# Patient Record
Sex: MALE
[De-identification: names, ages, dates, MRNs, and addresses within clinical notes are randomized per-mention and may not be internally consistent; named-entity substitution may affect disease eponyms.]

---

## 2017-09-08 ENCOUNTER — HOSPITAL ENCOUNTER (INPATIENT)
Dept: HOSPITAL 25 - ED | Age: 62
LOS: 28 days | Discharge: HOME | DRG: 885 | End: 2017-10-06
Attending: PSYCHIATRY & NEUROLOGY | Admitting: PSYCHIATRY & NEUROLOGY
Payer: MEDICARE

## 2017-09-08 DIAGNOSIS — K59.00: ICD-10-CM

## 2017-09-08 DIAGNOSIS — F20.5: Primary | ICD-10-CM

## 2017-09-08 DIAGNOSIS — Z81.8: ICD-10-CM

## 2017-09-08 DIAGNOSIS — I48.91: ICD-10-CM

## 2017-09-08 DIAGNOSIS — I35.0: ICD-10-CM

## 2017-09-08 DIAGNOSIS — Z91.19: ICD-10-CM

## 2017-09-08 DIAGNOSIS — Z91.14: ICD-10-CM

## 2017-09-08 LAB
ALBUMIN SERPL BCG-MCNC: 3.9 G/DL (ref 3.2–5.2)
ALP SERPL-CCNC: 50 U/L (ref 34–104)
ALT SERPL W P-5'-P-CCNC: 66 U/L (ref 7–52)
ANION GAP SERPL CALC-SCNC: 7 MMOL/L (ref 2–11)
APAP SERPL-MCNC: < 15 MCG/ML
AST SERPL-CCNC: 64 U/L (ref 13–39)
BUN SERPL-MCNC: 11 MG/DL (ref 6–24)
BUN/CREAT SERPL: 13.4 (ref 8–20)
CALCIUM SERPL-MCNC: 9.1 MG/DL (ref 8.6–10.3)
CHLORIDE SERPL-SCNC: 104 MMOL/L (ref 101–111)
GLOBULIN SER CALC-MCNC: 2.5 G/DL (ref 2–4)
GLUCOSE SERPL-MCNC: 101 MG/DL (ref 70–100)
HCO3 SERPL-SCNC: 26 MMOL/L (ref 22–32)
HCT VFR BLD AUTO: 38 % (ref 42–52)
HGB BLD-MCNC: 13.4 G/DL (ref 14–18)
LITHIUM SERPL-SCNC: < 0.1 MMOL/L (ref 0.6–1.2)
MCH RBC QN AUTO: 35 PG (ref 27–31)
MCHC RBC AUTO-ENTMCNC: 36 G/DL (ref 31–36)
MCV RBC AUTO: 97 FL (ref 80–94)
POTASSIUM SERPL-SCNC: 3.6 MMOL/L (ref 3.5–5)
PROT SERPL-MCNC: 6.4 G/DL (ref 6.4–8.9)
RBC # BLD AUTO: 3.9 10^6/UL (ref 4–5.4)
SALICYLATES SERPL-MCNC: < 2.5 MG/DL (ref ?–30)
SODIUM SERPL-SCNC: 137 MMOL/L (ref 133–145)
TSH SERPL-ACNC: 0.95 MCIU/ML (ref 0.34–5.6)
WBC # BLD AUTO: 4.9 10^3/UL (ref 3.5–10.8)

## 2017-09-08 PROCEDURE — 81003 URINALYSIS AUTO W/O SCOPE: CPT

## 2017-09-08 PROCEDURE — 93005 ELECTROCARDIOGRAM TRACING: CPT

## 2017-09-08 PROCEDURE — 85610 PROTHROMBIN TIME: CPT

## 2017-09-08 PROCEDURE — 80053 COMPREHEN METABOLIC PANEL: CPT

## 2017-09-08 PROCEDURE — 85730 THROMBOPLASTIN TIME PARTIAL: CPT

## 2017-09-08 PROCEDURE — 99231 SBSQ HOSP IP/OBS SF/LOW 25: CPT

## 2017-09-08 PROCEDURE — 99238 HOSP IP/OBS DSCHRG MGMT 30/<: CPT

## 2017-09-08 PROCEDURE — 36415 COLL VENOUS BLD VENIPUNCTURE: CPT

## 2017-09-08 PROCEDURE — 80329 ANALGESICS NON-OPIOID 1 OR 2: CPT

## 2017-09-08 PROCEDURE — G0480 DRUG TEST DEF 1-7 CLASSES: HCPCS

## 2017-09-08 PROCEDURE — 80178 ASSAY OF LITHIUM: CPT

## 2017-09-08 PROCEDURE — 80307 DRUG TEST PRSMV CHEM ANLYZR: CPT

## 2017-09-08 PROCEDURE — 83036 HEMOGLOBIN GLYCOSYLATED A1C: CPT

## 2017-09-08 PROCEDURE — 85025 COMPLETE CBC W/AUTO DIFF WBC: CPT

## 2017-09-08 PROCEDURE — 84443 ASSAY THYROID STIM HORMONE: CPT

## 2017-09-08 PROCEDURE — 80320 DRUG SCREEN QUANTALCOHOLS: CPT

## 2017-09-08 PROCEDURE — 80061 LIPID PANEL: CPT

## 2017-09-08 PROCEDURE — 90853 GROUP PSYCHOTHERAPY: CPT

## 2017-09-08 PROCEDURE — 99222 1ST HOSP IP/OBS MODERATE 55: CPT

## 2017-09-08 NOTE — ED
Alana MUSTAFA Thomas, scribed for Osiris Salomon MD on 09/08/17 at 2111 .





Psychiatric Complaint





- HPI Summary


HPI Summary: 





LEVEL 5 CAVEAT: HPI IS LIMITED BY PATIENTS UNCOOPERATIVE DEMEANOR 





The patient is a 63 y/o M BIB police. Upon questioning, the patient responds I 

refuse to talk to anyone. There is additional history provided in nursing 

documentation. He is pacing and agitated. 





- History Of Current Complaint


Chief Complaint: EDMentalHealth


Time Seen by Provider: 09/08/17 18:50


Hx Obtained From: Other: - Police, EMR, nursing


Character: Angry - refusing to talk or cooperate with MD and staff





- Allergies/Home Medications


Allergies/Adverse Reactions: 


 Allergies











Allergy/AdvReac Type Severity Reaction Status Date / Time


 


Molds & Smuts Allergy Unknown Unknown Verified 03/13/16 15:55





   Reaction  





   Details  


 


Penicillins Allergy Unknown Unknown Verified 03/13/16 15:55





   Reaction  





   Details  














PMH/Surg Hx/FS Hx/Imm Hx


Previously Healthy: No - LEVEL 5 CAVEAT: PMH IS LIMITED BY PATIENTS 

UNCOOPERATIVE DEMEANOR 


Endocrine/Hematology History: 


   Denies: Hx Anticoagulant Therapy


Sensory History: Reports: Hx Contacts or Glasses


Opthamlomology History: Reports: Hx Contacts or Glasses


Neurological History: Reports: Other Neuro Impairments/Disorders - hx of 

benztropine tx


   Denies: Hx Seizures


Psychiatric History: Reports: Hx Anxiety, Hx Depression, Hx Inpatient Treatment 

- [facility] from [facility] in 2013, Hx Community Mental Health Tx, Hx Schizophrenia - hx 

of navane tx. , Hx Bipolar Disorder, Hx of Violent Episodes Against Others, 

Other Psychiatric Issues/Disorders - Psychosis





- Immunization History


Date of Tetanus Vaccine: unk


Infectious Disease History: Unable to Obtain/Confirm


Infectious Disease History: 


   Denies: Traveled Outside the [place] in Last 30 Days





- Family History


Known Family History: Positive: Unknown - Patient is a LEVEL 5 CAVEAT


Family History: schizophrenia, depression





- Social History


Alcohol Use: refuses to cooperate with assessment


Substance Use Type: Reports: None


Smoking Status (MU): Never Smoked Tobacco





Review of Systems





- ROS Summary


Review of Systems Summary: 





LEVEL 5 CAVEAT: ROS IS LIMITED BY PATIENTS UNCOOPERATIVE DEMEANOR 








Negative: Fever


Positive: Other - POS: pacing, agitation


All Other Systems Reviewed And Are Negative: No





Physical Exam





- Summary


Physical Exam Summary: 





LEVEL 5 CAVEAT: PHYSICAL EXAM IS LIMITED BY PATIENTS UNCOOPERATIVE DEMEANOR 


Physical exam is by observation only.  Pt will not allow physical contact by me.





Triage Information Reviewed: Yes


Vital Signs On Initial Exam: 


 Initial Vitals











Temp Pulse Resp BP Pulse Ox


 


 98.8 F   106   18   174/81   99 


 


 09/08/17 17:21  09/08/17 17:21  09/08/17 17:21  09/08/17 17:21  09/08/17 17:21











Vital Signs Reviewed: Yes


Appearance: Positive: Well-Appearing, No Pain Distress, Well-Nourished


Skin: Positive: Skin Color Reflects Adequate Perfusion, Dry


Head/Face: Positive: Normal Head/Face Inspection


Eyes: Positive: Conjunctiva Clear


ENT: Positive: Normal ENT inspection


Neck: Positive: Supple


Respiratory/Lung Sounds: Positive: Other - No respiratory distress


Musculoskeletal: Positive: Strength/ROM Intact


Neurological: Positive: Facial Symmetry, Speech Normal


Psychiatric: Positive: Other - He is pacing and agitated, Patient Uncooperative 

for Exam





- [place] Coma Scale


Coma Scale Total: 14





Diagnostics





- Vital Signs


 Vital Signs











  Temp Pulse Resp BP Pulse Ox


 


 09/08/17 19:01    19  


 


 09/08/17 17:44  98.8 F  106  18  174/81  99


 


 09/08/17 17:21  98.8 F  106  18  174/81  99














- Laboratory


Lab Results: 


 Lab Results











  09/08/17 09/08/17 09/08/17 Range/Units





  18:05 18:05 18:40 


 


WBC   4.9   (3.5-10.8)  10^3/ul


 


RBC   3.90 L   (4.0-5.4)  10^6/ul


 


Hgb   13.4 L   (14.0-18.0)  g/dl


 


Hct   38 L   (42-52)  %


 


MCV   97 H   (80-94)  fL


 


MCH   35 H   (27-31)  pg


 


MCHC   36   (31-36)  g/dl


 


RDW   13   (10.5-15)  %


 


Plt Count   143 L   (150-450)  10^3/ul


 


MPV   8   (7.4-10.4)  um3


 


Neut % (Auto)   63.2   (38-83)  %


 


Lymph % (Auto)   23.6 L   (25-47)  %


 


Mono % (Auto)   10.3 H   (1-9)  %


 


Eos % (Auto)   2.4   (0-6)  %


 


Baso % (Auto)   0.5   (0-2)  %


 


Absolute Neuts (auto)   3.1   (1.5-7.7)  10^3/ul


 


Absolute Lymphs (auto)   1.2   (1.0-4.8)  10^3/ul


 


Absolute Monos (auto)   0.5   (0-0.8)  10^3/ul


 


Absolute Eos (auto)   0.1   (0-0.6)  10^3/ul


 


Absolute Basos (auto)   0   (0-0.2)  10^3/ul


 


Absolute Nucleated RBC   0   10^3/ul


 


Nucleated RBC %   0.1   


 


Sodium  137    (133-145)  mmol/L


 


Potassium  3.6    (3.5-5.0)  mmol/L


 


Chloride  104    (101-111)  mmol/L


 


Carbon Dioxide  26    (22-32)  mmol/L


 


Anion Gap  7    (2-11)  mmol/L


 


BUN  11    (6-24)  mg/dL


 


Creatinine  0.82    (0.67-1.17)  mg/dL


 


Est GFR ( Amer)  122.8    (>60)  


 


Est GFR (Non-Af Amer)  95.5    (>60)  


 


BUN/Creatinine Ratio  13.4    (8-20)  


 


Glucose  101 H    ()  mg/dL


 


Calcium  9.1    (8.6-10.3)  mg/dL


 


Total Bilirubin  1.10 H    (0.2-1.0)  mg/dL


 


AST  64 H    (13-39)  U/L


 


ALT  66 H    (7-52)  U/L


 


Alkaline Phosphatase  50    ()  U/L


 


Total Protein  6.4    (6.4-8.9)  g/dL


 


Albumin  3.9    (3.2-5.2)  g/dL


 


Globulin  2.5    (2-4)  g/dL


 


Albumin/Globulin Ratio  1.6    (1-3)  


 


TSH  0.95    (0.34-5.60)  mcIU/mL


 


Urine Color     


 


Urine Appearance     


 


Urine pH     (5-9)  


 


Ur Specific Gravity     (1.010-1.030)  


 


Urine Protein     (Negative)  


 


Urine Ketones     (Negative)  


 


Urine Blood     (Negative)  


 


Urine Nitrate     (Negative)  


 


Urine Bilirubin     (Negative)  


 


Urine Urobilinogen     (Negative)  


 


Ur Leukocyte Esterase     (Negative)  


 


Urine Glucose     (Negative)  


 


Salicylates  < 2.50    (<30)  mg/dL


 


Urine Opiates Screen    None detected  (None Detect)  


 


Acetaminophen  < 15    mcg/mL


 


Ur Barbiturates Screen    None detected  (None Detect)  


 


Ur Phencyclidine Scrn    None detected  (None Detect)  


 


Ur Amphetamines Screen    None detected  (None Detect)  


 


U Benzodiazepines Scrn    None detected  (None Detect)  


 


Urine Cocaine Screen    None detected  (None Detect)  


 


U Cannabinoids Screen    None detected  (None Detect)  


 


Serum Alcohol  < 10    (<10)  mg/dL














  09/08/17 Range/Units





  18:40 


 


WBC   (3.5-10.8)  10^3/ul


 


RBC   (4.0-5.4)  10^6/ul


 


Hgb   (14.0-18.0)  g/dl


 


Hct   (42-52)  %


 


MCV   (80-94)  fL


 


MCH   (27-31)  pg


 


MCHC   (31-36)  g/dl


 


RDW   (10.5-15)  %


 


Plt Count   (150-450)  10^3/ul


 


MPV   (7.4-10.4)  um3


 


Neut % (Auto)   (38-83)  %


 


Lymph % (Auto)   (25-47)  %


 


Mono % (Auto)   (1-9)  %


 


Eos % (Auto)   (0-6)  %


 


Baso % (Auto)   (0-2)  %


 


Absolute Neuts (auto)   (1.5-7.7)  10^3/ul


 


Absolute Lymphs (auto)   (1.0-4.8)  10^3/ul


 


Absolute Monos (auto)   (0-0.8)  10^3/ul


 


Absolute Eos (auto)   (0-0.6)  10^3/ul


 


Absolute Basos (auto)   (0-0.2)  10^3/ul


 


Absolute Nucleated RBC   10^3/ul


 


Nucleated RBC %   


 


Sodium   (133-145)  mmol/L


 


Potassium   (3.5-5.0)  mmol/L


 


Chloride   (101-111)  mmol/L


 


Carbon Dioxide   (22-32)  mmol/L


 


Anion Gap   (2-11)  mmol/L


 


BUN   (6-24)  mg/dL


 


Creatinine   (0.67-1.17)  mg/dL


 


Est GFR ( Amer)   (>60)  


 


Est GFR (Non-Af Amer)   (>60)  


 


BUN/Creatinine Ratio   (8-20)  


 


Glucose   ()  mg/dL


 


Calcium   (8.6-10.3)  mg/dL


 


Total Bilirubin   (0.2-1.0)  mg/dL


 


AST   (13-39)  U/L


 


ALT   (7-52)  U/L


 


Alkaline Phosphatase   ()  U/L


 


Total Protein   (6.4-8.9)  g/dL


 


Albumin   (3.2-5.2)  g/dL


 


Globulin   (2-4)  g/dL


 


Albumin/Globulin Ratio   (1-3)  


 


TSH   (0.34-5.60)  mcIU/mL


 


Urine Color  Straw  


 


Urine Appearance  Clear  


 


Urine pH  5.0  (5-9)  


 


Ur Specific Gravity  1.005 L  (1.010-1.030)  


 


Urine Protein  Negative  (Negative)  


 


Urine Ketones  Trace H  (Negative)  


 


Urine Blood  Negative  (Negative)  


 


Urine Nitrate  Negative  (Negative)  


 


Urine Bilirubin  Negative  (Negative)  


 


Urine Urobilinogen  Negative  (Negative)  


 


Ur Leukocyte Esterase  Negative  (Negative)  


 


Urine Glucose  Negative  (Negative)  


 


Salicylates   (<30)  mg/dL


 


Urine Opiates Screen   (None Detect)  


 


Acetaminophen   mcg/mL


 


Ur Barbiturates Screen   (None Detect)  


 


Ur Phencyclidine Scrn   (None Detect)  


 


Ur Amphetamines Screen   (None Detect)  


 


U Benzodiazepines Scrn   (None Detect)  


 


Urine Cocaine Screen   (None Detect)  


 


U Cannabinoids Screen   (None Detect)  


 


Serum Alcohol   (<10)  mg/dL











Result Diagrams: 


 09/22/17 17:47





 09/22/17 18:30


Lab Statement: Any lab studies that have been ordered have been reviewed, and 

results considered in the medical decision making process.





- EKG


  ** 21:30


Cardiac Rate: NL - A-Fib at 88 BPM. Nml AV/IV/QTc. Left axis deviation (2). No 

acute changes. Nonspecific ST T-Wave changes.


EKG Comparison: No Significant Change - compard to 05/11/13





Course/Dx





- Course


Assessment/Plan: The patient is a 63 y/o M who is brought in by police and is 

uncooperative on examination. He is medically cleared at 19:27. Bloodwork shows 

RBC 3.9, Hgb 13.4, Hct 38, Plt Count 143, bilirubin 1.1, AST 64, ALT 66. UA 

shows trace ketones and specific gravity 1.005. Urine toxicology is negative 

and lithium is less than 0.10. EKG shows A-Fib at 88 BPM with no acute changes. 

He is diagnosed with Bipolar disorder acute exacerbation, medication non-

compliance, and elevated liver functions, chronic atrial fibrillation at a 

controlled rate. He will be admitted to [facility].





- Differential Dx/Clinical Impression


Provider Diagnosis: 


 Bipolar disorder acute exacerbation, Non compliance w medication regimen, 

Elevated liver function tests








- Critical Care Time


Critical Care Time: 30-74 min - 30 minutes in uncooperative mental health 

patient requiring medical restraints with haldol, benadryl and lorazepam.





Discharge





- Discharge Plan


Condition: Fair


Disposition: ADMITTED TO [facility] documentation as recorded by the Alana bernabe Thomas accurately reflects 

the service I personally performed and the decisions made by Aime corrales Barbara J, MD.

## 2017-09-09 RX ADMIN — Medication SCH: at 14:29

## 2017-09-09 RX ADMIN — WARFARIN SODIUM SCH: 2.5 TABLET ORAL at 17:31

## 2017-09-09 RX ADMIN — LOSARTAN POTASSIUM SCH: 25 TABLET, FILM COATED ORAL at 14:29

## 2017-09-09 RX ADMIN — Medication SCH: at 17:31

## 2017-09-09 RX ADMIN — WARFARIN SODIUM SCH: 2 TABLET ORAL at 17:30

## 2017-09-09 RX ADMIN — BENZTROPINE MESYLATE SCH: 1 TABLET ORAL at 14:29

## 2017-09-09 RX ADMIN — LITHIUM CARBONATE SCH: 300 TABLET ORAL at 01:07

## 2017-09-09 RX ADMIN — WARFARIN SODIUM SCH MG: 2 TABLET ORAL at 17:55

## 2017-09-09 RX ADMIN — SENNOSIDES SCH: 8.6 TABLET, FILM COATED ORAL at 14:29

## 2017-09-09 RX ADMIN — THERA TABS SCH: TAB at 14:29

## 2017-09-09 RX ADMIN — CLONAZEPAM SCH: 1 TABLET ORAL at 22:28

## 2017-09-09 RX ADMIN — LITHIUM CARBONATE SCH MG: 300 TABLET ORAL at 20:12

## 2017-09-09 RX ADMIN — POLYETHYLENE GLYCOL 3350 SCH: 17 POWDER, FOR SOLUTION ORAL at 14:29

## 2017-09-09 RX ADMIN — WARFARIN SODIUM SCH MG: 2.5 TABLET ORAL at 17:56

## 2017-09-09 RX ADMIN — SENNOSIDES SCH: 8.6 TABLET, FILM COATED ORAL at 20:16

## 2017-09-09 RX ADMIN — Medication SCH: at 22:28

## 2017-09-09 RX ADMIN — BENZTROPINE MESYLATE SCH: 1 TABLET ORAL at 22:28

## 2017-09-10 RX ADMIN — Medication SCH: at 13:20

## 2017-09-10 RX ADMIN — WARFARIN SODIUM SCH: 2.5 TABLET ORAL at 17:35

## 2017-09-10 RX ADMIN — LITHIUM CARBONATE SCH: 300 TABLET ORAL at 21:24

## 2017-09-10 RX ADMIN — THERA TABS SCH: TAB at 13:20

## 2017-09-10 RX ADMIN — BENZTROPINE MESYLATE SCH: 1 TABLET ORAL at 13:20

## 2017-09-10 RX ADMIN — LOSARTAN POTASSIUM SCH: 25 TABLET, FILM COATED ORAL at 13:20

## 2017-09-10 RX ADMIN — Medication SCH: at 17:35

## 2017-09-10 RX ADMIN — POLYETHYLENE GLYCOL 3350 SCH: 17 POWDER, FOR SOLUTION ORAL at 13:20

## 2017-09-10 RX ADMIN — BENZTROPINE MESYLATE SCH: 1 TABLET ORAL at 21:23

## 2017-09-10 RX ADMIN — SENNOSIDES SCH: 8.6 TABLET, FILM COATED ORAL at 21:24

## 2017-09-10 RX ADMIN — SENNOSIDES SCH: 8.6 TABLET, FILM COATED ORAL at 13:20

## 2017-09-10 RX ADMIN — Medication SCH: at 21:23

## 2017-09-10 RX ADMIN — CLONAZEPAM SCH: 1 TABLET ORAL at 21:23

## 2017-09-10 RX ADMIN — WARFARIN SODIUM SCH: 2 TABLET ORAL at 17:35

## 2017-09-11 RX ADMIN — Medication SCH: at 10:39

## 2017-09-11 RX ADMIN — RISPERIDONE SCH: 3 TABLET ORAL at 21:29

## 2017-09-11 RX ADMIN — WARFARIN SODIUM SCH: 2.5 TABLET ORAL at 18:04

## 2017-09-11 RX ADMIN — SENNOSIDES SCH: 8.6 TABLET, FILM COATED ORAL at 21:29

## 2017-09-11 RX ADMIN — THERA TABS SCH: TAB at 10:39

## 2017-09-11 RX ADMIN — POLYETHYLENE GLYCOL 3350 SCH: 17 POWDER, FOR SOLUTION ORAL at 10:39

## 2017-09-11 RX ADMIN — CLONAZEPAM SCH: 1 TABLET ORAL at 21:28

## 2017-09-11 RX ADMIN — Medication SCH: at 18:04

## 2017-09-11 RX ADMIN — LOSARTAN POTASSIUM SCH: 25 TABLET, FILM COATED ORAL at 10:39

## 2017-09-11 RX ADMIN — ALUMINUM HYDROXIDE, MAGNESIUM HYDROXIDE, AND SIMETHICONE PRN ML: 200; 200; 20 SUSPENSION ORAL at 17:22

## 2017-09-11 RX ADMIN — BENZTROPINE MESYLATE SCH: 1 TABLET ORAL at 21:28

## 2017-09-11 RX ADMIN — Medication SCH TAB: at 14:00

## 2017-09-11 RX ADMIN — SENNOSIDES SCH: 8.6 TABLET, FILM COATED ORAL at 10:39

## 2017-09-11 RX ADMIN — BENZTROPINE MESYLATE SCH: 1 TABLET ORAL at 10:39

## 2017-09-11 RX ADMIN — LITHIUM CARBONATE SCH: 300 TABLET ORAL at 21:29

## 2017-09-11 RX ADMIN — ALUMINUM HYDROXIDE, MAGNESIUM HYDROXIDE, AND SIMETHICONE PRN ML: 200; 200; 20 SUSPENSION ORAL at 12:40

## 2017-09-11 RX ADMIN — Medication SCH: at 21:28

## 2017-09-11 RX ADMIN — WARFARIN SODIUM SCH: 2 TABLET ORAL at 18:04

## 2017-09-11 NOTE — PN
Subjective





- Subjective


Service Type: 63655 Hosp care 15 min low complexity


Subjective: 





Patient noted to be dismissive of staff and focused on discharge since 

admission. Patient declined interview with this provider.


Patient is noted to be on coumadin and RN reported to this provider that his 

last INR was 1.1 on 9/7/17. Patient's Lithium level


also noted to be subtherapeutic on admission at 0.10. Patient's Navane nor his 

Risperdal was not restarted on admission. Patient


is on Coumadin therapy for Afib but there seems to be 2 orders entered for 

admission at the same time. Patient has likely been 


non-compliant with these meds as well. Patient noted to be non-compliant with 

ACT team. Patient has had multiple psychiatric 


admissions most 2/2 becoming med non-compliant. Patient admitted this Saturday 

for decompensated paranoid Schizophrenia. 


Will D/C Navane and patient historically has asked to be taken off this med. 

Will continue Risperdal with plan to initiate Invega Sustenna


prior to his discharge.





Objective





- Appearance


Appearance: Healthy Appearing, Thin Framed


Dysmorphic Features: No


Hygiene: Normal


Grooming: Fairly Well Kept





- Behavior


Psychomotor Activities: Normal


Exhibits Abnormal Movement: No





- Attitude and Relatedness


Attitude and Relatedness: Regressed


Eye Contact: Poor





- Speech


Quality: Unpressured


Latencies: Normal


Quantity: Terse





- Affect


Observed Affect: Tense


Affect Consistent with: Dysphoria





- Thought Process


Thought Content: Yes Paranoid Ideation - present on admission, unable to assess 

today, No Passive Death Wish - unable to assess, No Suicidal Planning - unable 

to assess, No Homicidal Ideation - unable to assess





- Sensorium


Type of Hallucinations: Visual: No - unable to assess, Auditory: No - unable to 

assess, Command: No - unable to assess





- Level of Consciousness


Level of Consciousness: Alert


Orientation: Yes Intact, Yes Orientated to Time, Yes Orientated to Place, Yes 

Orientated to Person





- Impulse Control


Impulse Control: Impaired





- Insight and Judgement


Insight and Judgement: Impaired





- Group Participation


Particating in Group Activities: No





- Medication Management


Medication Management Adherence: No





Assessment





- Assessment


Merits Inpatient Hospitalization: For Immediate Safety, For Stabilization


Inpatient DSM-IV Dx: Schizophrenia





Plan





- Plan


Treatment Plan: 


Name: JYOTI STEEN                        


YOB: 1955                        


[id number]


[id number]








1. Continue admission to [facility] for safety and symptom mx.


2. Patient's Navane nor his Risperdal was not restarted on admission.


    Will D/C Navane due to patient's reported disdain for med and hx of med non-

compliance.


3. Will Continue Risperdal at 3mg po qhs for psychosis with plan to initiate 

Invega Sustenna prior to discharge.


4. Continue Lithium at home dose of 1,200mg po qhs for mood.  


   Patient's Lithium level noted to be subtherapeutic on admission at 0.10. 


5. Will call outpt provider who Rx's Coumadin to clarify dose. Will continue 

Coumadin as currently Rx'd. 


    Patient's last INR was 1.1 on 9/7/17.  


6. Continue to obtain collateral information from family and ACTteam. 


7. Patient to participate in milieu activities and groups.


Medications: 


 Current Medications





Acetaminophen (Tylenol Tab*)  650 mg PO Q4H PRN


   PRN Reason: PAIN or TEMP > 101 F


Al Hydrox/Mg Hydrox/Simethicone (Maalox Plus*)  30 ml PO Q4H PRN


   PRN Reason: INDIGESTION


Benztropine Mesylate (Cogentin Tab*)  1 mg PO BID [facility]


   Last Admin: 09/10/17 21:23 Dose:  Not Given


Calcium/Vitamin D (Oscal D Tab 250/125*)  2 tab PO BID [facility]


   Last Admin: 09/10/17 21:23 Dose:  Not Given


Clonazepam (Klonopin Tab(*))  1 mg PO BEDTIME [facility]


   Last Admin: 09/10/17 21:23 Dose:  Not Given


Lithium Carbonate (Lithium Carbonate Tab*)  1,200 mg PO BEDTIME [facility]


   Last Admin: 09/10/17 21:24 Dose:  Not Given


Losartan Potassium (Cozaar Tab*)  25 mg PO DAILY [facility]


   Last Admin: 09/10/17 13:20 Dose:  Not Given


Multivitamins (Theragran Tab*)  1 tab PO DAILY [facility]


   Last Admin: 09/10/17 13:20 Dose:  Not Given


Nicotine Polacrilex (Nicotine Gum*)  2 mg PO Q2H PRN


   PRN Reason: CRAVING


Non Formulary  Med* (Melatonin 1mg Tab)  1 admin PO 1700 [facility]


   Last Admin: 09/10/17 17:35 Dose:  Not Given


Polyethylene Glycol/Electrolytes (Miralax*)  17 gm PO DAILY [facility]


   Last Admin: 09/10/17 13:20 Dose:  Not Given


Senna (Senokot Tab*)  1 tab PO BID [facility]


   Last Admin: 09/10/17 21:24 Dose:  Not Given


Warfarin Sodium (Coumadin Tab(*))  2 mg PO 1700 [facility]


   Last Admin: 09/10/17 17:35 Dose:  Not Given


Warfarin Sodium (Coumadin Tab(*))  2.5 mg PO 1700 [facility]


   Last Admin: 09/10/17 17:35 Dose:  Not Given











- Discharge Plan


Discharge Plan: Outpatient Follow Up

## 2017-09-12 RX ADMIN — LOSARTAN POTASSIUM SCH: 25 TABLET, FILM COATED ORAL at 09:44

## 2017-09-12 RX ADMIN — WARFARIN SODIUM SCH: 2.5 TABLET ORAL at 16:46

## 2017-09-12 RX ADMIN — POLYETHYLENE GLYCOL 3350 SCH: 17 POWDER, FOR SOLUTION ORAL at 09:44

## 2017-09-12 RX ADMIN — CLONAZEPAM SCH: 1 TABLET ORAL at 20:55

## 2017-09-12 RX ADMIN — SENNOSIDES SCH: 8.6 TABLET, FILM COATED ORAL at 20:55

## 2017-09-12 RX ADMIN — CLONAZEPAM SCH: 1 TABLET ORAL at 07:25

## 2017-09-12 RX ADMIN — Medication SCH: at 20:55

## 2017-09-12 RX ADMIN — CLONAZEPAM SCH MG: 1 TABLET ORAL at 00:56

## 2017-09-12 RX ADMIN — WARFARIN SODIUM SCH: 2 TABLET ORAL at 16:45

## 2017-09-12 RX ADMIN — Medication SCH: at 09:44

## 2017-09-12 RX ADMIN — RISPERIDONE SCH: 3 TABLET ORAL at 20:55

## 2017-09-12 RX ADMIN — BENZTROPINE MESYLATE SCH: 1 TABLET ORAL at 09:43

## 2017-09-12 RX ADMIN — LITHIUM CARBONATE SCH: 300 TABLET ORAL at 20:55

## 2017-09-12 RX ADMIN — SENNOSIDES SCH: 8.6 TABLET, FILM COATED ORAL at 09:44

## 2017-09-12 RX ADMIN — Medication SCH: at 16:45

## 2017-09-12 RX ADMIN — BENZTROPINE MESYLATE SCH: 1 TABLET ORAL at 20:55

## 2017-09-12 RX ADMIN — THERA TABS SCH: TAB at 09:44

## 2017-09-12 RX ADMIN — RISPERIDONE SCH MG: 3 TABLET ORAL at 00:57

## 2017-09-12 NOTE — PN
Subjective





- Subjective


Service Type: 70540 Hosp care 15 min low complexity


Subjective: 





Patient noted to be isolated to his room most of the day. He is up for meals 

and is makes needs known to staff.


Patient continues to be bizarre in behavior and has been observed responding to 

internal stimuli. Patient has refused 


meds. He also refused blood draw for repeat INR. Patient again declines to 

interview with this provider. Will initiate 


process of TOO.





Objective





- Appearance


Appearance: Well Developed/Nourished, Thin Framed


Dysmorphic Features: No


Hygiene: Normal


Grooming: Fairly Well Kept





- Behavior


Psychomotor Activities: Abnormal-Increased


Exhibits Abnormal Movement: No





- Attitude and Relatedness


Attitude and Relatedness: Irritable


Eye Contact: Poor





- Speech


Quality: Unpressured


Latencies: Normal


Quantity: Terse





- Mood


Patient's Decription of Mood: Unable to assess.





- Affect


Observed Affect: Tense


Affect Consistent with: Dysphoria





- Thought Process


Patient's Thought Process: Impoverished


Thought Content: No Passive Death Wish - Unable to assess., No Suicidal 

Planning - Unable to assess., No Homicidal Ideation - Unable to assess., No 

Paranoid Ideation - Unable to assess.





- Sensorium


Experiencing Hallucinations: No, Sensorium is Clear


Type of Hallucinations: Visual: No - Unable to assess., Auditory: No - Unable 

to assess., Command: No - Unable to assess.





- Level of Consciousness


Level of Consciousness: Alert


Orientation: Yes Intact, Yes Orientated to Time, Yes Orientated to Place, Yes 

Orientated to Person





- Impulse Control


Impulse Control: Impaired





- Insight and Judgement


Insight and Judgement: Impaired





- Group Participation


Particating in Group Activities: No





- Medication Management


Medication Management Adherence: No





Assessment





- Assessment


Merits Inpatient Hospitalization: For Immediate Safety, For Stabilization


Inpatient DSM-IV Dx: Schizophrenia





Plan





- Plan


Treatment Plan: 


Name: JYOTI STEEN                        


YOB: 1955                        


[id number]


[id number]








1. Continue admission to [facility] for safety and symptom mx.


2. Patient's Navane nor his Risperdal was restarted on admission.


    Will D/C Navane due to patient's reported disdain for med and hx of med non-

compliance.


3. Will Continue Risperdal at 3mg po qhs for psychosis with plan to initiate 

Invega Sustenna prior to discharge.


4. Continue Lithium at home dose of 1,200mg po qhs for mood.  


   Patient's Lithium level noted to be subtherapeutic on admission at 0.10. 


5. Will continue Coumadin as currently Rx'd. Patient's last INR was 1.16 on 9/8/ 17.  


6. Continue to obtain collateral information from family and ACTteam. 


7. Patient to participate in milieu activities and groups.


Medications: 


 Current Medications





Acetaminophen (Tylenol Tab*)  650 mg PO Q4H PRN


   PRN Reason: PAIN or TEMP > 101 F


Al Hydrox/Mg Hydrox/Simethicone (Maalox Plus*)  30 ml PO Q4H PRN


   PRN Reason: INDIGESTION


   Last Admin: 09/11/17 17:22 Dose:  30 ml


Benztropine Mesylate (Cogentin Tab*)  1 mg PO BID [facility]


   Last Admin: 09/12/17 09:43 Dose:  Not Given


Calcium/Vitamin D (Oscal D Tab 250/125*)  2 tab PO BID [facility]


   Last Admin: 09/12/17 09:44 Dose:  Not Given


Clonazepam (Klonopin Tab(*))  1 mg PO BEDTIME [facility]


   Last Admin: 09/12/17 07:25 Dose:  Not Given


Lithium Carbonate (Lithium Carbonate Tab*)  1,200 mg PO BEDTIME [facility]


   Last Admin: 09/11/17 21:29 Dose:  Not Given


Losartan Potassium (Cozaar Tab*)  25 mg PO DAILY [facility]


   Last Admin: 09/12/17 09:44 Dose:  Not Given


Multivitamins (Theragran Tab*)  1 tab PO DAILY [facility]


   Last Admin: 09/12/17 09:44 Dose:  Not Given


Nicotine Polacrilex (Nicotine Gum*)  2 mg PO Q2H PRN


   PRN Reason: CRAVING


Non Formulary  Med* (Melatonin 1mg Tab)  1 admin PO 1700 [facility]


   Last Admin: 09/11/17 18:04 Dose:  Not Given


Polyethylene Glycol/Electrolytes (Miralax*)  17 gm PO DAILY [facility]


   Last Admin: 09/12/17 09:44 Dose:  Not Given


Risperidone (Risperdal*)  3 mg PO BEDTIME [facility]


   Last Admin: 09/12/17 00:57 Dose:  3 mg


Senna (Senokot Tab*)  1 tab PO BID [facility]


   Last Admin: 09/12/17 09:44 Dose:  Not Given


Warfarin Sodium (Coumadin Tab(*))  2 mg PO 1700 [facility]


   Last Admin: 09/11/17 18:04 Dose:  Not Given


Warfarin Sodium (Coumadin Tab(*))  2.5 mg PO 1700 [facility]


   Last Admin: 09/11/17 18:04 Dose:  Not Given











- Discharge Plan


Discharge Plan: Outpatient Follow Up

## 2017-09-13 RX ADMIN — LOSARTAN POTASSIUM SCH: 25 TABLET, FILM COATED ORAL at 08:16

## 2017-09-13 RX ADMIN — POLYETHYLENE GLYCOL 3350 SCH: 17 POWDER, FOR SOLUTION ORAL at 08:16

## 2017-09-13 RX ADMIN — CLONAZEPAM SCH: 1 TABLET ORAL at 20:03

## 2017-09-13 RX ADMIN — Medication SCH: at 20:03

## 2017-09-13 RX ADMIN — BENZTROPINE MESYLATE SCH: 1 TABLET ORAL at 08:15

## 2017-09-13 RX ADMIN — RIVAROXABAN SCH: 20 TABLET, FILM COATED ORAL at 17:13

## 2017-09-13 RX ADMIN — LITHIUM CARBONATE SCH: 300 TABLET ORAL at 20:03

## 2017-09-13 RX ADMIN — Medication SCH: at 17:14

## 2017-09-13 RX ADMIN — RISPERIDONE SCH: 3 TABLET ORAL at 20:03

## 2017-09-13 RX ADMIN — BENZTROPINE MESYLATE SCH: 1 TABLET ORAL at 20:03

## 2017-09-13 RX ADMIN — THERA TABS SCH: TAB at 08:16

## 2017-09-13 RX ADMIN — Medication SCH: at 08:15

## 2017-09-13 RX ADMIN — POLYETHYLENE GLYCOL 3350 SCH GM: 17 POWDER, FOR SOLUTION ORAL at 08:11

## 2017-09-13 RX ADMIN — SENNOSIDES SCH: 8.6 TABLET, FILM COATED ORAL at 20:03

## 2017-09-13 RX ADMIN — SENNOSIDES SCH: 8.6 TABLET, FILM COATED ORAL at 08:16

## 2017-09-13 NOTE — PN
Subjective





- Subjective


Service Type: [id number] Hosp care 15 min low complexity


Subjective: 





Patient is visible in the milieu pacing. He is noted to be social with select 

peers. Patient continues to be paranoid of


staff and declining initiation in conversation. Patient again declined to speak 

to this provider. Patient educated on why


this provider is concerned with his med non-compliance, with special focus on 

his dx of afib and his increased risk of MI 


or CVA with each day he does not take his coumadin anticoagulation therapy. 

Patient stated, "I don't have atrial fibrillation"


and walked away. Patient has not been med compliant. Patient has attended to 

ADLs, is up for meals, and is able to


verbalize his needs to staff.





Objective





- Appearance


Appearance: Well Developed/Nourished


Dysmorphic Features: No


Hygiene: Normal


Grooming: Fairly Well Kept





- Behavior


Psychomotor Activities: Normal


Exhibits Abnormal Movement: No





- Attitude and Relatedness


Attitude and Relatedness: Psychotically Related


Eye Contact: Poor





- Speech


Quality: Unpressured


Latencies: Normal


Quantity: Terse





- Mood


Patient's Decription of Mood: "Fine"





- Affect


Observed Affect: Constricted


Affect Consistent with: Dysphoria





- Thought Process


Patient's Thought Process: Coherent


Thought Content: No Passive Death Wish - unable to assess, No Suicidal Planning 

- unable to assess, No Homicidal Ideation - unable to assess, No Paranoid 

Ideation - unable to assess





- Sensorium


Experiencing Hallucinations: No, Sensorium is Clear


Type of Hallucinations: Visual: No - unable to assess, Auditory: No - unable to 

assess, Command: No - unable to assess





- Level of Consciousness


Level of Consciousness: Alert


Orientation: Yes Intact, Yes Orientated to Time, Yes Orientated to Place, Yes 

Orientated to Person





- Impulse Control


Impulse Control: Intact





- Insight and Judgement


Insight and Judgement: Fair





- Group Participation


Particating in Group Activities: Yes





- Medication Management


Medication Management Adherence: Yes





Assessment





- Assessment


Merits Inpatient Hospitalization: For Immediate Safety, For Stabilization


Inpatient DSM-IV Dx: Schizophrenia





Plan





- Plan


Treatment Plan: 


Name: JYOTI STEEN                        


YOB: 1955                        


[id number]


[id number]








1. Continue admission to [facility] for safety and symptom mx.


2. Patient's Navane nor his Risperdal was restarted on admission.


    Will D/C Navane due to patient's reported disdain for med and hx of med non-

compliance.


3. Continue to encourage med compliance with Risperdal at 3mg po qhs for 

psychosis with plan to initiate Invega Sustenna prior to discharge.


4. Continue to encourage med compliance with Lithium at home dose of 1,200mg po 

qhs for mood.  


    Patient's Lithium level noted to be sub-therapeutic on admission at 0.10. 


5. Patient informed Coumadin will be replaced for Xarelto due to patient's non-

compliance on Coumadin and to provide immediate anticoagulation


    coverage. Patient's last INR was 1.16 on 9/8/17.  


6. Continue to obtain collateral information from family and ACT team. 


7. Patient to participate in milieu activities and groups.


Continued Medication Management: Different Medication


Medications: 


 Current Medications





Acetaminophen (Tylenol Tab*)  650 mg PO Q4H PRN


   PRN Reason: PAIN or TEMP > 101 F


Al Hydrox/Mg Hydrox/Simethicone (Maalox Plus*)  30 ml PO Q4H PRN


   PRN Reason: INDIGESTION


   Last Admin: 09/11/17 17:22 Dose:  30 ml


Benztropine Mesylate (Cogentin Tab*)  1 mg PO BID [facility]


   Last Admin: 09/13/17 08:15 Dose:  Not Given


Calcium/Vitamin D (Oscal D Tab 250/125*)  2 tab PO BID [facility]


   Last Admin: 09/13/17 08:15 Dose:  Not Given


Clonazepam (Klonopin Tab(*))  1 mg PO BEDTIME [facility]


   Last Admin: 09/12/17 20:55 Dose:  Not Given


Lithium Carbonate (Lithium Carbonate Tab*)  1,200 mg PO BEDTIME [facility]


   Last Admin: 09/12/17 20:55 Dose:  Not Given


Losartan Potassium (Cozaar Tab*)  25 mg PO DAILY [facility]


   Last Admin: 09/13/17 08:16 Dose:  Not Given


Multivitamins (Theragran Tab*)  1 tab PO DAILY [facility]


   Last Admin: 09/13/17 08:16 Dose:  Not Given


Nicotine Polacrilex (Nicotine Gum*)  2 mg PO Q2H PRN


   PRN Reason: CRAVING


Non Formulary  Med* (Melatonin 1mg Tab)  1 admin PO 1700 [facility]


   Last Admin: 09/12/17 16:45 Dose:  Not Given


Polyethylene Glycol/Electrolytes (Miralax*)  17 gm PO DAILY [facility]


   Last Admin: 09/13/17 08:16 Dose:  Not Given


Risperidone (Risperdal*)  3 mg PO BEDTIME [facility]


   Last Admin: 09/12/17 20:55 Dose:  Not Given


Senna (Senokot Tab*)  1 tab PO BID [facility]


   Last Admin: 09/13/17 08:16 Dose:  Not Given


Warfarin Sodium (Coumadin Tab(*))  2 mg PO 1700 [facility]


   Last Admin: 09/12/17 16:45 Dose:  Not Given


Warfarin Sodium (Coumadin Tab(*))  2.5 mg PO 1700 [facility]


   Last Admin: 09/12/17 16:46 Dose:  Not Given











- Discharge Plan


Discharge Plan: Outpatient Follow Up


Outpatient Program: [facility]

## 2017-09-14 RX ADMIN — POLYETHYLENE GLYCOL 3350 SCH: 17 POWDER, FOR SOLUTION ORAL at 10:02

## 2017-09-14 RX ADMIN — LITHIUM CARBONATE SCH: 300 TABLET ORAL at 20:06

## 2017-09-14 RX ADMIN — SENNOSIDES SCH: 8.6 TABLET, FILM COATED ORAL at 10:02

## 2017-09-14 RX ADMIN — LOSARTAN POTASSIUM SCH: 25 TABLET, FILM COATED ORAL at 10:01

## 2017-09-14 RX ADMIN — Medication SCH: at 20:06

## 2017-09-14 RX ADMIN — Medication SCH: at 17:00

## 2017-09-14 RX ADMIN — BENZTROPINE MESYLATE SCH: 1 TABLET ORAL at 10:01

## 2017-09-14 RX ADMIN — RIVAROXABAN SCH: 20 TABLET, FILM COATED ORAL at 15:11

## 2017-09-14 RX ADMIN — SENNOSIDES SCH: 8.6 TABLET, FILM COATED ORAL at 20:07

## 2017-09-14 RX ADMIN — RISPERIDONE SCH: 3 TABLET ORAL at 20:06

## 2017-09-14 RX ADMIN — Medication SCH: at 10:01

## 2017-09-14 RX ADMIN — THERA TABS SCH: TAB at 10:02

## 2017-09-14 RX ADMIN — CLONAZEPAM SCH: 1 TABLET ORAL at 20:06

## 2017-09-14 RX ADMIN — BENZTROPINE MESYLATE SCH: 1 TABLET ORAL at 20:06

## 2017-09-14 RX ADMIN — RIVAROXABAN SCH: 20 TABLET, FILM COATED ORAL at 10:02

## 2017-09-14 NOTE — PN
Subjective





- Subjective


Service Type: [id number] Hosp care 15 min low complexity


Subjective: 





Patient is visible in the milieu pacing, isolative today. He was informed TOO 

process was iniitiated. Patient continues to 


be paranoid of staff. Patient again declined to speak to this provider. Patient 

encouraged patient to take Xarelto for


anticoagulation due to increasing risk of MI/CVA from Afib while not on 

anticoagulation therapy.  This was done to no avail.


Patient has not been compliant with any meds. Patient has attended to ADLs, is 

up for meals, and is able to


verbalize his needs to staff.





Objective





- Appearance


Appearance: Thin Framed


Dysmorphic Features: No


Hygiene: Normal


Grooming: Fairly Well Kept





- Behavior


Psychomotor Activities: Normal


Exhibits Abnormal Movement: No





- Attitude and Relatedness


Attitude and Relatedness: Psychotically Related


Eye Contact: Poor





- Speech


Quality: Unpressured


Latencies: Normal


Quantity: Terse





- Mood


Patient's Decription of Mood: "Fine"





- Affect


Observed Affect: Tense


Affect Consistent with: Dysphoria





- Thought Process


Patient's Thought Process: Coherent


Thought Content: Yes Paranoid Ideation, No Passive Death Wish, No Suicidal 

Planning, No Homicidal Ideation





- Sensorium


Experiencing Hallucinations: No, Sensorium is Clear


Type of Hallucinations: Visual: No, Auditory: No, Command: No





- Level of Consciousness


Level of Consciousness: Alert


Orientation: Yes Intact, Yes Orientated to Time, Yes Orientated to Place, Yes 

Orientated to Person





- Impulse Control


Impulse Control: Intact





- Insight and Judgement


Insight and Judgement: Impaired





- Group Participation


Particating in Group Activities: No





- Medication Management


Medication Management Adherence: No





Assessment





- Assessment


Merits Inpatient Hospitalization: For Immediate Safety, For Stabilization


Inpatient DSM-IV Dx: Schizophrenia





Plan





- Plan


Treatment Plan: 


Name: JYOTI STEEN                        


YOB: 1955                        


[id number]


[id number]








1. Continue admission to [facility] for safety and symptom mx.


2. Patient's Navane nor his Risperdal was restarted on admission.


    Will D/C Navane due to patient's reported disdain for med and hx of med non-

compliance.


3. Continue to encourage med compliance with Risperdal at 3mg po qhs for 

psychosis with plan to initiate Invega Sustenna prior to discharge.


4. Continue to encourage med compliance with Lithium at home dose of 1,200mg po 

qhs for mood.  


    Patient's Lithium level noted to be sub-therapeutic on admission at 0.10. 


5. Continue to encourage Xarelto 20mg po daily for anticoagulation therapy dx-

Afib. Patient non-compliance on 


    Coumadin and Xarelto provides immediate anticoagulation.


6. Collateral information obtained from PCP-Dr. Rachid Luevano#[phone] and 

ACT team. 


7. Patient to participate in milieu activities and groups.


Medications: 


 Current Medications





Acetaminophen (Tylenol Tab*)  650 mg PO Q4H PRN


   PRN Reason: PAIN or TEMP > 101 F


Al Hydrox/Mg Hydrox/Simethicone (Maalox Plus*)  30 ml PO Q4H PRN


   PRN Reason: INDIGESTION


   Last Admin: 09/11/17 17:22 Dose:  30 ml


Benztropine Mesylate (Cogentin Tab*)  1 mg PO BID [facility]


   Last Admin: 09/14/17 10:01 Dose:  Not Given


Calcium/Vitamin D (Oscal D Tab 250/125*)  2 tab PO BID [facility]


   Last Admin: 09/14/17 10:01 Dose:  Not Given


Clonazepam (Klonopin Tab(*))  1 mg PO BEDTIME [facility]


   Last Admin: 09/13/17 20:03 Dose:  Not Given


Lithium Carbonate (Lithium Carbonate Tab*)  1,200 mg PO BEDTIME [facility]


   Last Admin: 09/13/17 20:03 Dose:  Not Given


Losartan Potassium (Cozaar Tab*)  25 mg PO DAILY [facility]


   Last Admin: 09/14/17 10:01 Dose:  Not Given


Melatonin (Melatonin (Nf))  3 mg PO BEDTIME [facility]


Multivitamins (Theragran Tab*)  1 tab PO DAILY [facility]


   Last Admin: 09/14/17 10:02 Dose:  Not Given


Nicotine Polacrilex (Nicotine Gum*)  2 mg PO Q2H PRN


   PRN Reason: CRAVING


Non Formulary  Med* (Melatonin 1mg Tab)  1 admin PO 1700 [facility]


   Last Admin: 09/13/17 17:14 Dose:  Not Given


Polyethylene Glycol/Electrolytes (Miralax*)  17 gm PO DAILY [facility]


   Last Admin: 09/14/17 10:02 Dose:  Not Given


Risperidone (Risperdal*)  3 mg PO BEDTIME [facility]


   Last Admin: 09/13/17 20:03 Dose:  Not Given


Rivaroxaban (Xarelto (*))  20 mg PO DAILY [facility]


   Last Admin: 09/14/17 10:02 Dose:  Not Given


Senna (Senokot Tab*)  1 tab PO BID [facility]


   Last Admin: 09/14/17 10:02 Dose:  Not Given











- Discharge Plan


Discharge Plan: Outpatient Follow Up


Outpatient Program: ACT team

## 2017-09-15 PROCEDURE — GZHZZZZ GROUP PSYCHOTHERAPY: ICD-10-PCS

## 2017-09-15 RX ADMIN — LITHIUM CARBONATE SCH: 300 TABLET ORAL at 20:53

## 2017-09-15 RX ADMIN — RISPERIDONE SCH: 3 TABLET ORAL at 20:53

## 2017-09-15 RX ADMIN — POLYETHYLENE GLYCOL 3350 SCH: 17 POWDER, FOR SOLUTION ORAL at 08:35

## 2017-09-15 RX ADMIN — LOSARTAN POTASSIUM SCH: 25 TABLET, FILM COATED ORAL at 08:35

## 2017-09-15 RX ADMIN — Medication SCH: at 18:01

## 2017-09-15 RX ADMIN — CLONAZEPAM SCH: 1 TABLET ORAL at 20:52

## 2017-09-15 RX ADMIN — THERA TABS SCH: TAB at 08:35

## 2017-09-15 RX ADMIN — Medication SCH: at 08:35

## 2017-09-15 RX ADMIN — BENZTROPINE MESYLATE SCH: 1 TABLET ORAL at 08:35

## 2017-09-15 RX ADMIN — Medication SCH: at 20:53

## 2017-09-15 RX ADMIN — SENNOSIDES SCH: 8.6 TABLET, FILM COATED ORAL at 20:53

## 2017-09-15 RX ADMIN — Medication SCH TAB: at 10:34

## 2017-09-15 RX ADMIN — RIVAROXABAN SCH: 20 TABLET, FILM COATED ORAL at 08:35

## 2017-09-15 RX ADMIN — Medication SCH: at 21:41

## 2017-09-15 RX ADMIN — SENNOSIDES SCH: 8.6 TABLET, FILM COATED ORAL at 08:35

## 2017-09-15 RX ADMIN — BENZTROPINE MESYLATE SCH: 1 TABLET ORAL at 20:52

## 2017-09-15 NOTE — PN
Subjective





- Subjective


Service Type: [id number] Hosp care 15 min low complexity


Subjective: 





Patient continues to decline to talk to this provider. Patient is visible in 

the milieu pacing/walking, isolative today. He was again


encouraged to take Xarelto for anticoagulation due to increased of MI/CVA from 

Afib while not on anticoagulation therapy. This 


was done to no avail. Patient has not been compliant with any meds. Patient has 

attended to ADLs, is up for meals, and is able to


verbalize his needs to staff.





Objective





- Appearance


Appearance: Well Developed/Nourished


Dysmorphic Features: No


Hygiene: Normal


Grooming: Fairly Well Kept





- Behavior


Psychomotor Activities: Normal


Exhibits Abnormal Movement: No





- Attitude and Relatedness


Attitude and Relatedness: Cooperative


Eye Contact: Fair





- Speech


Quality: Unpressured


Latencies: Normal


Quantity: Appropriate





- Mood


Patient's Decription of Mood: "Irritable"





- Affect


Observed Affect: Good


Affect Consistent with: Euthymia





- Thought Process


Patient's Thought Process: Coherent


Thought Content: No Passive Death Wish, No Suicidal Planning, No Homicidal 

Ideation, No Paranoid Ideation





- Sensorium


Experiencing Hallucinations: No, Sensorium is Clear


Type of Hallucinations: Visual: No, Auditory: No, Command: No





- Level of Consciousness


Level of Consciousness: Alert


Orientation: Yes Intact, Yes Orientated to Time, Yes Orientated to Place, Yes 

Orientated to Person





- Impulse Control


Impulse Control: Impaired





- Insight and Judgement


Insight and Judgement: Impaired





- Group Participation


Particating in Group Activities: Yes





- Medication Management


Medication Management Adherence: Yes





Assessment





- Assessment


Merits Inpatient Hospitalization: For Immediate Safety, For Stabilization


Inpatient DSM-IV Dx: Schizophrenia





Plan





- Plan


Treatment Plan: 


Name: JYOTI STEEN                        


YOB: 1955                        


[id number]


[id number]








1. Continue admission to [facility] for safety and symptom mx.


2. Navane D/C'd due to patient's reported disdain for med and hx of med non-

compliance.


3. Continue to encourage med compliance with Risperdal at 3mg po qhs for 

psychosis with plan to initiate Invega Sustenna prior to discharge.


4. Continue to encourage med compliance with Lithium at home dose of 1,200mg po 

qhs for mood.  


    Patient's Lithium level noted to be sub-therapeutic on admission at 0.10. 


5. Continue to encourage Xarelto 20mg po daily for anticoagulation therapy dx-

Afib. Patient non-compliance on 


    Coumadin and Xarelto provides immediate anticoagulation.


6. Collateral information obtained from PCP-Dr. Rachid Luevano#[phone] and 

ACT team. 


7. Patient to participate in milieu activities and groups.


Medications: 


 Current Medications





Acetaminophen (Tylenol Tab*)  650 mg PO Q4H PRN


   PRN Reason: PAIN or TEMP > 101 F


Al Hydrox/Mg Hydrox/Simethicone (Maalox Plus*)  30 ml PO Q4H PRN


   PRN Reason: INDIGESTION


   Last Admin: 09/11/17 17:22 Dose:  30 ml


Benztropine Mesylate (Cogentin Tab*)  1 mg PO BID [facility]


   Last Admin: 09/15/17 08:35 Dose:  Not Given


Calcium/Vitamin D (Oscal D Tab 250/125*)  2 tab PO BID [facility]


   Last Admin: 09/15/17 08:35 Dose:  Not Given


Clonazepam (Klonopin Tab(*))  1 mg PO BEDTIME [facility]


   Last Admin: 09/14/17 20:06 Dose:  Not Given


Lithium Carbonate (Lithium Carbonate Tab*)  1,200 mg PO BEDTIME [facility]


   Last Admin: 09/14/17 20:06 Dose:  Not Given


Losartan Potassium (Cozaar Tab*)  25 mg PO DAILY [facility]


   Last Admin: 09/15/17 08:35 Dose:  Not Given


Melatonin (Melatonin (Nf))  3 mg PO BEDTIME [facility]


   Last Admin: 09/14/17 20:06 Dose:  Not Given


Multivitamins (Theragran Tab*)  1 tab PO DAILY [facility]


   Last Admin: 09/15/17 08:35 Dose:  Not Given


Nicotine Polacrilex (Nicotine Gum*)  2 mg PO Q2H PRN


   PRN Reason: CRAVING


Non Formulary  Med* (Melatonin 1mg Tab)  1 admin PO 1700 [facility]


   Last Admin: 09/14/17 17:00 Dose:  Not Given


Polyethylene Glycol/Electrolytes (Miralax*)  17 gm PO DAILY [facility]


   Last Admin: 09/15/17 08:35 Dose:  Not Given


Risperidone (Risperdal*)  3 mg PO BEDTIME [facility]


   Last Admin: 09/14/17 20:06 Dose:  Not Given


Rivaroxaban (Xarelto (*))  20 mg PO DAILY [facility]


   Last Admin: 09/15/17 08:35 Dose:  Not Given


Senna (Senokot Tab*)  1 tab PO BID [facility]


   Last Admin: 09/15/17 08:35 Dose:  Not Given











- Discharge Plan


Discharge Plan: Outpatient Follow Up

## 2017-09-15 NOTE — PN
[facility]: Group Therapy Note





- Service Type


Service Type: [id number] Group Psychotherapy - Cognitive behavioral group note:  

Rasheed attended programming this morning and was attentive and participatory in 

discussion.  He discussed historical problems with various medications, citing 

how they often are effective for a few months and then become less effective 

over time.  He did not express paranoid thoughts, but remains defended 

regarding decisions regarding medications.

## 2017-09-16 RX ADMIN — POLYETHYLENE GLYCOL 3350 SCH: 17 POWDER, FOR SOLUTION ORAL at 10:40

## 2017-09-16 RX ADMIN — RISPERIDONE SCH: 3 TABLET ORAL at 20:58

## 2017-09-16 RX ADMIN — RIVAROXABAN SCH: 20 TABLET, FILM COATED ORAL at 10:40

## 2017-09-16 RX ADMIN — Medication SCH: at 20:57

## 2017-09-16 RX ADMIN — BENZTROPINE MESYLATE SCH: 1 TABLET ORAL at 10:40

## 2017-09-16 RX ADMIN — BENZTROPINE MESYLATE SCH: 1 TABLET ORAL at 20:57

## 2017-09-16 RX ADMIN — LITHIUM CARBONATE SCH: 300 TABLET ORAL at 20:58

## 2017-09-16 RX ADMIN — SENNOSIDES SCH: 8.6 TABLET, FILM COATED ORAL at 10:40

## 2017-09-16 RX ADMIN — CLONAZEPAM SCH: 1 TABLET ORAL at 20:57

## 2017-09-16 RX ADMIN — Medication SCH: at 17:45

## 2017-09-16 RX ADMIN — SENNOSIDES SCH: 8.6 TABLET, FILM COATED ORAL at 20:58

## 2017-09-16 RX ADMIN — Medication SCH: at 20:58

## 2017-09-16 RX ADMIN — THERA TABS SCH: TAB at 10:40

## 2017-09-16 RX ADMIN — LOSARTAN POTASSIUM SCH: 25 TABLET, FILM COATED ORAL at 10:40

## 2017-09-16 RX ADMIN — Medication SCH: at 10:40

## 2017-09-17 RX ADMIN — LOSARTAN POTASSIUM SCH: 25 TABLET, FILM COATED ORAL at 09:51

## 2017-09-17 RX ADMIN — BENZTROPINE MESYLATE SCH: 1 TABLET ORAL at 09:51

## 2017-09-17 RX ADMIN — Medication SCH: at 09:51

## 2017-09-17 RX ADMIN — ALUMINUM HYDROXIDE, MAGNESIUM HYDROXIDE, AND SIMETHICONE PRN ML: 200; 200; 20 SUSPENSION ORAL at 09:55

## 2017-09-17 RX ADMIN — THERA TABS SCH: TAB at 09:52

## 2017-09-17 RX ADMIN — CLONAZEPAM SCH: 1 TABLET ORAL at 20:05

## 2017-09-17 RX ADMIN — POLYETHYLENE GLYCOL 3350 SCH: 17 POWDER, FOR SOLUTION ORAL at 09:51

## 2017-09-17 RX ADMIN — SENNOSIDES SCH: 8.6 TABLET, FILM COATED ORAL at 20:05

## 2017-09-17 RX ADMIN — LITHIUM CARBONATE SCH: 300 TABLET ORAL at 20:05

## 2017-09-17 RX ADMIN — Medication SCH: at 17:06

## 2017-09-17 RX ADMIN — RIVAROXABAN SCH: 20 TABLET, FILM COATED ORAL at 09:52

## 2017-09-17 RX ADMIN — Medication SCH: at 20:05

## 2017-09-17 RX ADMIN — SENNOSIDES SCH: 8.6 TABLET, FILM COATED ORAL at 09:52

## 2017-09-17 RX ADMIN — RISPERIDONE SCH: 3 TABLET ORAL at 20:05

## 2017-09-17 RX ADMIN — BENZTROPINE MESYLATE SCH: 1 TABLET ORAL at 20:05

## 2017-09-18 RX ADMIN — Medication SCH: at 08:12

## 2017-09-18 RX ADMIN — LITHIUM CARBONATE SCH: 300 TABLET ORAL at 19:51

## 2017-09-18 RX ADMIN — BENZTROPINE MESYLATE SCH: 1 TABLET ORAL at 19:51

## 2017-09-18 RX ADMIN — RIVAROXABAN SCH: 20 TABLET, FILM COATED ORAL at 08:12

## 2017-09-18 RX ADMIN — BENZTROPINE MESYLATE SCH: 1 TABLET ORAL at 08:12

## 2017-09-18 RX ADMIN — POLYETHYLENE GLYCOL 3350 SCH: 17 POWDER, FOR SOLUTION ORAL at 08:12

## 2017-09-18 RX ADMIN — CLONAZEPAM SCH: 1 TABLET ORAL at 19:51

## 2017-09-18 RX ADMIN — LOSARTAN POTASSIUM SCH: 25 TABLET, FILM COATED ORAL at 08:12

## 2017-09-18 RX ADMIN — Medication SCH: at 16:41

## 2017-09-18 RX ADMIN — Medication SCH: at 19:51

## 2017-09-18 RX ADMIN — THERA TABS SCH: TAB at 08:12

## 2017-09-18 RX ADMIN — RISPERIDONE SCH: 3 TABLET ORAL at 19:51

## 2017-09-18 RX ADMIN — SENNOSIDES SCH: 8.6 TABLET, FILM COATED ORAL at 08:12

## 2017-09-18 RX ADMIN — SENNOSIDES SCH: 8.6 TABLET, FILM COATED ORAL at 19:51

## 2017-09-18 NOTE — PN
Subjective





- Subjective


Service Type: [id number] Hosp care 15 min low complexity


Subjective: 





Patient is visible in the milieu pacing/walking, noted to be isolative over the 

weekend. He was again


encouraged to take Xarelto for anticoagulation due to increased of MI/CVA from 

Afib while not on 


anticoagulation therapy. Patient has not been compliant with any meds. Patient 

has attended to ADLs, 


is up for meals, and is able to verbalize his needs to staff. Patient again 

declined interview with


this provider.





Objective





- Appearance


Appearance: Thin Framed


Dysmorphic Features: No


Hygiene: Normal


Grooming: Fairly Well Kept





- Behavior


Psychomotor Activities: Normal


Exhibits Abnormal Movement: No





- Attitude and Relatedness


Attitude and Relatedness: Psychotically Related


Eye Contact: Poor





- Speech


Quality: Unpressured


Latencies: Normal


Quantity: Terse





- Mood


Patient's Decription of Mood: unable to assess





- Affect


Observed Affect: Constricted


Affect Consistent with: Dysphoria





- Thought Process


Thought Content: No Passive Death Wish - unable to assess, No Suicidal Planning 

- unable to assess, No Homicidal Ideation - unable to assess, No Paranoid 

Ideation - unable to assess





- Sensorium


Type of Hallucinations: Visual: No - unable to assess, Auditory: No - unable to 

assess, Command: No - unable to assess





- Level of Consciousness


Level of Consciousness: Alert


Orientation: Yes Intact, Yes Orientated to Time, Yes Orientated to Place, Yes 

Orientated to Person





- Impulse Control


Impulse Control: Intact





- Insight and Judgement


Insight and Judgement: Impaired





- Group Participation


Particating in Group Activities: No





- Medication Management


Medication Management Adherence: No





Assessment





- Assessment


Merits Inpatient Hospitalization: For Immediate Safety, For Stabilization


Inpatient DSM-IV Dx: Schizophrenia





Plan





- Plan


Treatment Plan: 


Name: JYOTI STEEN                        


YOB: 1955                        


[id number]


[id number]








1. Continue admission to [facility] for safety and symptom mx.


2. Navane D/C'd due to patient's reported disdain for med and hx of med non-

compliance.


3. Continue to encourage med compliance with Risperdal at 3mg po qhs for 

psychosis with plan to initiate Invega Sustenna prior to discharge.


4. Continue to encourage med compliance with Lithium at home dose of 1,200mg po 

qhs for mood.  


    Patient's Lithium level noted to be sub-therapeutic on admission at 0.10. 


5. Continue to encourage Xarelto 20mg po daily for anticoagulation therapy dx-

Afib. Patient non-compliant on 


    Coumadin and Xarelto provides immediate anticoagulation.


6. Collateral information obtained from PCP-Dr. Rachid Luevano#[phone] and 

ACT team. 


7. Patient to participate in milieu activities and groups.


Continued Medication Management: Different Medication


Medications: 


 Current Medications





Acetaminophen (Tylenol Tab*)  650 mg PO Q4H PRN


   PRN Reason: PAIN or TEMP > 101 F


Al Hydrox/Mg Hydrox/Simethicone (Maalox Plus*)  30 ml PO Q4H PRN


   PRN Reason: INDIGESTION


   Last Admin: 09/17/17 09:55 Dose:  30 ml


Benztropine Mesylate (Cogentin Tab*)  1 mg PO BID [facility]


   Last Admin: 09/18/17 08:12 Dose:  Not Given


Calcium/Vitamin D (Oscal D Tab 250/125*)  2 tab PO BID [facility]


   Last Admin: 09/18/17 08:12 Dose:  Not Given


Clonazepam (Klonopin Tab(*))  1 mg PO BEDTIME [facility]


   Last Admin: 09/17/17 20:05 Dose:  Not Given


Lithium Carbonate (Lithium Carbonate Tab*)  1,200 mg PO BEDTIME [facility]


   Last Admin: 09/17/17 20:05 Dose:  Not Given


Losartan Potassium (Cozaar Tab*)  25 mg PO DAILY [facility]


   Last Admin: 09/18/17 08:12 Dose:  Not Given


Melatonin (Melatonin (Nf))  3 mg PO BEDTIME [facility]


   Last Admin: 09/17/17 20:05 Dose:  Not Given


Melatonin (Melatonin (Nf))  1 tab PO 1700 [facility]


   Last Admin: 09/18/17 16:41 Dose:  Not Given


Multi-Ingredient Liniment/Rub (Eric De Luna*)  1 applic TOPICAL TID PRN


   PRN Reason: MUSCLE ACHE


Multivitamins (Theragran Tab*)  1 tab PO DAILY [facility]


   Last Admin: 09/18/17 08:12 Dose:  Not Given


Nicotine Polacrilex (Nicotine Gum*)  2 mg PO Q2H PRN


   PRN Reason: CRAVING


Polyethylene Glycol/Electrolytes (Miralax*)  17 gm PO DAILY [facility]


   Last Admin: 09/18/17 08:12 Dose:  Not Given


Risperidone (Risperdal*)  3 mg PO BEDTIME [facility]


   Last Admin: 09/17/17 20:05 Dose:  Not Given


Rivaroxaban (Xarelto (*))  20 mg PO DAILY [facility]


   Last Admin: 09/18/17 08:12 Dose:  Not Given


Senna (Senokot Tab*)  1 tab PO BID [facility]


   Last Admin: 09/18/17 08:12 Dose:  Not Given











- Discharge Plan


Discharge Plan: Outpatient Follow Up


Outpatient Program: ACT team

## 2017-09-19 RX ADMIN — SENNOSIDES SCH: 8.6 TABLET, FILM COATED ORAL at 08:45

## 2017-09-19 RX ADMIN — Medication SCH: at 22:07

## 2017-09-19 RX ADMIN — LITHIUM CARBONATE SCH: 300 TABLET ORAL at 22:07

## 2017-09-19 RX ADMIN — LOSARTAN POTASSIUM SCH: 25 TABLET, FILM COATED ORAL at 08:44

## 2017-09-19 RX ADMIN — POLYETHYLENE GLYCOL 3350 SCH: 17 POWDER, FOR SOLUTION ORAL at 08:45

## 2017-09-19 RX ADMIN — BENZTROPINE MESYLATE SCH: 1 TABLET ORAL at 08:44

## 2017-09-19 RX ADMIN — Medication SCH: at 08:44

## 2017-09-19 RX ADMIN — Medication SCH: at 17:38

## 2017-09-19 RX ADMIN — SENNOSIDES SCH: 8.6 TABLET, FILM COATED ORAL at 22:07

## 2017-09-19 RX ADMIN — CLONAZEPAM SCH: 1 TABLET ORAL at 22:07

## 2017-09-19 RX ADMIN — BENZTROPINE MESYLATE SCH: 1 TABLET ORAL at 22:07

## 2017-09-19 RX ADMIN — RIVAROXABAN SCH: 20 TABLET, FILM COATED ORAL at 08:45

## 2017-09-19 RX ADMIN — THERA TABS SCH: TAB at 08:45

## 2017-09-19 RX ADMIN — RISPERIDONE SCH: 3 TABLET ORAL at 22:07

## 2017-09-19 RX ADMIN — MENTHOL, METHYL SALICYLATE PRN APPLIC: 10; 15 CREAM TOPICAL at 11:32

## 2017-09-19 NOTE — PN
Subjective





- Subjective


Service Type: [id number] Hosp care 15 min low complexity


Subjective: 





Patient is visible in the milieu pacing/walking. Still no interactions with 

peers, patient still refuses


conversing with staff and this provider. He was again encouraged to take 

Xarelto for anticoagulation 


due to increased of MI/CVA from Afib while not on anticoagulation therapy. 

Patient has not been 


compliant with any meds. Patient has attended to ADLs, is up for meals, and is 

able to verbalize his 


needs to staff. Patient noted to be getting good sleep. 





Objective





- Appearance


Appearance: Well Developed/Nourished, Thin Framed


Dysmorphic Features: No


Hygiene: Normal


Grooming: Fairly Well Kept





- Behavior


Psychomotor Activities: Normal


Exhibits Abnormal Movement: No





- Attitude and Relatedness


Attitude and Relatedness: Psychotically Related


Eye Contact: Poor





- Speech


Quality: Unpressured


Latencies: Normal


Quantity: Terse





- Mood


Patient's Decription of Mood: unable to assess





- Affect


Observed Affect: Depressed


Affect Consistent with: Dysphoria





- Thought Process


Patient's Thought Process: Impoverished


Thought Content: No Passive Death Wish - unable to assess, No Suicidal Planning 

- unable to assess, No Homicidal Ideation - unable to assess, No Paranoid 

Ideation - unable to assess





- Sensorium


Experiencing Hallucinations: No, Sensorium is Clear


Type of Hallucinations: Visual: No - unable to assess, Auditory: No - unable to 

assess, Command: No - unable to assess





- Level of Consciousness


Level of Consciousness: Alert


Orientation: Yes Intact, Yes Orientated to Time, Yes Orientated to Place, Yes 

Orientated to Person





- Impulse Control


Impulse Control: Impaired





- Insight and Judgement


Insight and Judgement: Impaired





- Group Participation


Particating in Group Activities: No





- Medication Management


Medication Management Adherence: No





Assessment





- Assessment


Merits Inpatient Hospitalization: For Immediate Safety, For Stabilization


Inpatient DSM-IV Dx: Schizophrenia





Plan





- Plan


Treatment Plan: 


Name: JYOTI STEEN                        


YOB: 1955                        


[id number]


[id number]








1. Continue admission to [facility] for safety and symptom mx.


2. Navane D/C'd due to patient's reported disdain for med and hx of med non-

compliance.


3. Patient has been med non-compliant and symptomatic since admission. 


    Hearing for treatment over objection scheduled for 9/22/17 at 9am.


4. Continue to encourage med compliance with Risperdal at 3mg po qhs for 

psychosis with plan to initiate Invega Sustenna prior to discharge.


5. Continue to encourage med compliance with Lithium at home dose of 1,200mg po 

qhs for mood.  


    Patient's Lithium level noted to be sub-therapeutic on admission at 0.10. 


6. Continue to encourage Xarelto 20mg po daily for anticoagulation therapy dx-

Afib. Patient non-compliant on 


    Coumadin and Xarelto provides immediate anticoagulation.


7. Collateral information obtained from PCP-Dr. Rachid Luevano#[phone] and 

ACT team. 


8. Patient to participate in milieu activities and groups.


Medications: 


 Current Medications





Acetaminophen (Tylenol Tab*)  650 mg PO Q4H PRN


   PRN Reason: PAIN or TEMP > 101 F


Al Hydrox/Mg Hydrox/Simethicone (Maalox Plus*)  30 ml PO Q4H PRN


   PRN Reason: INDIGESTION


   Last Admin: 09/17/17 09:55 Dose:  30 ml


Benztropine Mesylate (Cogentin Tab*)  1 mg PO BID [facility]


   Last Admin: 09/19/17 08:44 Dose:  Not Given


Calcium/Vitamin D (Oscal D Tab 250/125*)  2 tab PO BID [facility]


   Last Admin: 09/19/17 08:44 Dose:  Not Given


Clonazepam (Klonopin Tab(*))  1 mg PO BEDTIME [facility]


   Last Admin: 09/18/17 19:51 Dose:  Not Given


Lithium Carbonate (Lithium Carbonate Tab*)  1,200 mg PO BEDTIME [facility]


   Last Admin: 09/18/17 19:51 Dose:  Not Given


Losartan Potassium (Cozaar Tab*)  25 mg PO DAILY [facility]


   Last Admin: 09/19/17 08:44 Dose:  Not Given


Melatonin (Melatonin (Nf))  3 mg PO BEDTIME [facility]


   Last Admin: 09/18/17 19:51 Dose:  Not Given


Melatonin (Melatonin (Nf))  1 tab PO 1700 [facility]


   Last Admin: 09/18/17 16:41 Dose:  Not Given


Multi-Ingredient Liniment/Rub (Eric De Luna*)  1 applic TOPICAL TID PRN


   PRN Reason: MUSCLE ACHE


Multivitamins (Theragran Tab*)  1 tab PO DAILY [facility]


   Last Admin: 09/19/17 08:45 Dose:  Not Given


Nicotine Polacrilex (Nicotine Gum*)  2 mg PO Q2H PRN


   PRN Reason: CRAVING


Polyethylene Glycol/Electrolytes (Miralax*)  17 gm PO DAILY [facility]


   Last Admin: 09/19/17 08:45 Dose:  Not Given


Risperidone (Risperdal*)  3 mg PO BEDTIME [facility]


   Last Admin: 09/18/17 19:51 Dose:  Not Given


Rivaroxaban (Xarelto (*))  20 mg PO DAILY [facility]


   Last Admin: 09/19/17 08:45 Dose:  Not Given


Senna (Senokot Tab*)  1 tab PO BID [facility]


   Last Admin: 09/19/17 08:45 Dose:  Not Given

## 2017-09-20 RX ADMIN — Medication SCH: at 10:57

## 2017-09-20 RX ADMIN — SENNOSIDES SCH: 8.6 TABLET, FILM COATED ORAL at 20:20

## 2017-09-20 RX ADMIN — Medication SCH: at 20:20

## 2017-09-20 RX ADMIN — Medication SCH: at 17:31

## 2017-09-20 RX ADMIN — LITHIUM CARBONATE SCH: 300 TABLET ORAL at 20:20

## 2017-09-20 RX ADMIN — SENNOSIDES SCH: 8.6 TABLET, FILM COATED ORAL at 10:58

## 2017-09-20 RX ADMIN — RIVAROXABAN SCH: 20 TABLET, FILM COATED ORAL at 10:58

## 2017-09-20 RX ADMIN — BENZTROPINE MESYLATE SCH: 1 TABLET ORAL at 20:19

## 2017-09-20 RX ADMIN — RISPERIDONE SCH: 3 TABLET ORAL at 20:20

## 2017-09-20 RX ADMIN — POLYETHYLENE GLYCOL 3350 SCH: 17 POWDER, FOR SOLUTION ORAL at 10:58

## 2017-09-20 RX ADMIN — BENZTROPINE MESYLATE SCH: 1 TABLET ORAL at 10:57

## 2017-09-20 RX ADMIN — LOSARTAN POTASSIUM SCH: 25 TABLET, FILM COATED ORAL at 10:57

## 2017-09-20 RX ADMIN — CLONAZEPAM SCH: 1 TABLET ORAL at 20:20

## 2017-09-20 RX ADMIN — THERA TABS SCH: TAB at 10:58

## 2017-09-20 NOTE — PN
Subjective





- Subjective


Service Type: [id number] Hosp care 15 min low complexity


Subjective: 





Patient is visible in the milieu pacing/walking. He was again encouraged to 

take Xarelto for anticoagulation 


due to increased of MI/CVA from Afib while not on anticoagulation therapy. 

Patient has not been 


compliant with any meds. Patient has attended to ADLs, is up for meals, and is 

able to verbalize his 


needs to staff. Patient is noted to have fair appetite and sleep.





Objective





- Appearance


Appearance: Well Developed/Nourished, Thin Framed


Dysmorphic Features: No


Hygiene: Normal


Grooming: Fairly Well Kept





- Behavior


Psychomotor Activities: Normal


Exhibits Abnormal Movement: No





- Attitude and Relatedness


Attitude and Relatedness: Psychotically Related


Eye Contact: Poor





- Affect


Observed Affect: Depressed


Affect Consistent with: Dysphoria





- Thought Process


Thought Content: No Passive Death Wish - unable to sense, No Suicidal Planning 

- unable to sense, No Homicidal Ideation - unable to sense, No Paranoid 

Ideation - unable to sense





- Sensorium


Type of Hallucinations: Visual: No - unable to sense, Auditory: No - unable to 

sense, Command: No - unable to sense





- Level of Consciousness


Level of Consciousness: Alert


Orientation: No Intact, No Orientated to Time, No Orientated to Place, No 

Orientated to Person





- Impulse Control


Impulse Control: Impaired





- Insight and Judgement


Insight and Judgement: Impaired





- Group Participation


Particating in Group Activities: No





- Medication Management


Medication Management Adherence: No





Assessment





- Assessment


Merits Inpatient Hospitalization: For Immediate Safety, For Stabilization


Inpatient DSM-IV Dx: Schizophrenia





Plan





- Plan


Treatment Plan: 


Name: JYOTI STEEN                        


YOB: 1955                        


[id number]


[id number]








1. Continue admission to [facility] for safety and symptom mx.


2. Navane D/C'd due to patient's reported disdain for med and hx of med non-

compliance.


3. Patient has been med non-compliant and symptomatic since admission. 


    Hearing for treatment over objection scheduled for 9/22/17 at 9am.


4. Continue to encourage med compliance with Risperdal at 3mg po qhs for 

psychosis with plan to initiate Invega Sustenna prior to discharge.


5. Continue to encourage med compliance with Lithium at home dose of 1,200mg po 

qhs for mood.  


    Patient's Lithium level noted to be sub-therapeutic on admission at 0.10. 


6. Continue to encourage Xarelto 20mg po daily for anticoagulation therapy dx-

Afib. Patient non-compliant on 


    Coumadin and Xarelto provides immediate anticoagulation.


7. Collateral information obtained from PCP-Dr. Rachid Luevano#[phone] and 

ACT team. 


8. Patient to participate in milieu activities and groups.


Medications: 


 Current Medications





Acetaminophen (Tylenol Tab*)  650 mg PO Q4H PRN


   PRN Reason: PAIN or TEMP > 101 F


Al Hydrox/Mg Hydrox/Simethicone (Maalox Plus*)  30 ml PO Q4H PRN


   PRN Reason: INDIGESTION


   Last Admin: 09/17/17 09:55 Dose:  30 ml


Benztropine Mesylate (Cogentin Tab*)  1 mg PO BID [facility]


   Last Admin: 09/19/17 22:07 Dose:  Not Given


Calcium/Vitamin D (Oscal D Tab 250/125*)  2 tab PO BID [facility]


   Last Admin: 09/19/17 22:07 Dose:  Not Given


Clonazepam (Klonopin Tab(*))  1 mg PO BEDTIME [facility]


   Last Admin: 09/19/17 22:07 Dose:  Not Given


Lithium Carbonate (Lithium Carbonate Tab*)  1,200 mg PO BEDTIME [facility]


   Last Admin: 09/19/17 22:07 Dose:  Not Given


Losartan Potassium (Cozaar Tab*)  25 mg PO DAILY [facility]


   Last Admin: 09/19/17 08:44 Dose:  Not Given


Melatonin (Melatonin (Nf))  3 mg PO BEDTIME [facility]


   Last Admin: 09/19/17 22:07 Dose:  Not Given


Melatonin (Melatonin (Nf))  1 tab PO 1700 [facility]


   Last Admin: 09/19/17 17:38 Dose:  Not Given


Multi-Ingredient Liniment/Rub (Eric De Luna*)  1 applic TOPICAL TID PRN


   PRN Reason: MUSCLE ACHE


   Last Admin: 09/19/17 11:32 Dose:  1 applic


Multivitamins (Theragran Tab*)  1 tab PO DAILY [facility]


   Last Admin: 09/19/17 08:45 Dose:  Not Given


Nicotine Polacrilex (Nicotine Gum*)  2 mg PO Q2H PRN


   PRN Reason: CRAVING


Polyethylene Glycol/Electrolytes (Miralax*)  17 gm PO DAILY [facility]


   Last Admin: 09/19/17 08:45 Dose:  Not Given


Risperidone (Risperdal*)  3 mg PO BEDTIME [facility]


   Last Admin: 09/19/17 22:07 Dose:  Not Given


Rivaroxaban (Xarelto (*))  20 mg PO DAILY [facility]


   Last Admin: 09/19/17 08:45 Dose:  Not Given


Senna (Senokot Tab*)  1 tab PO BID [facility]


   Last Admin: 09/19/17 22:07 Dose:  Not Given











- Discharge Plan


Discharge Plan: Outpatient Follow Up

## 2017-09-21 RX ADMIN — LITHIUM CARBONATE SCH: 300 TABLET ORAL at 21:31

## 2017-09-21 RX ADMIN — RISPERIDONE SCH: 3 TABLET ORAL at 21:31

## 2017-09-21 RX ADMIN — LOSARTAN POTASSIUM SCH: 25 TABLET, FILM COATED ORAL at 09:06

## 2017-09-21 RX ADMIN — Medication SCH: at 21:31

## 2017-09-21 RX ADMIN — POLYETHYLENE GLYCOL 3350 SCH: 17 POWDER, FOR SOLUTION ORAL at 09:06

## 2017-09-21 RX ADMIN — RIVAROXABAN SCH: 20 TABLET, FILM COATED ORAL at 09:06

## 2017-09-21 RX ADMIN — Medication SCH: at 21:30

## 2017-09-21 RX ADMIN — CLONAZEPAM SCH: 1 TABLET ORAL at 21:30

## 2017-09-21 RX ADMIN — BENZTROPINE MESYLATE SCH: 1 TABLET ORAL at 09:06

## 2017-09-21 RX ADMIN — Medication SCH: at 17:12

## 2017-09-21 RX ADMIN — BENZTROPINE MESYLATE SCH: 1 TABLET ORAL at 21:30

## 2017-09-21 RX ADMIN — THERA TABS SCH: TAB at 09:06

## 2017-09-21 RX ADMIN — Medication SCH: at 09:06

## 2017-09-21 RX ADMIN — SENNOSIDES SCH: 8.6 TABLET, FILM COATED ORAL at 21:31

## 2017-09-21 RX ADMIN — SENNOSIDES SCH: 8.6 TABLET, FILM COATED ORAL at 09:06

## 2017-09-21 NOTE — PN
Subjective





- Subjective


Service Type: [id number] Hosp care 15 min low complexity


Subjective: 





Patient is visible in the milieu pacing/walking. Still no interactions with 

peers, patient still refuses


conversing with staff and this provider. He was again encouraged to take 

Xarelto for anticoagulation 


due to increased of MI/CVA from Afib while not on anticoagulation therapy. 

Patient has not been 


compliant with any meds. Patient is able to verbalize his needs to staff.  








Objective





- Appearance


Appearance: Well Developed/Nourished, Thin Framed


Dysmorphic Features: No


Hygiene: Normal


Grooming: Fairly Well Kept





- Behavior


Psychomotor Activities: Normal


Exhibits Abnormal Movement: No





- Attitude and Relatedness


Attitude and Relatedness: Psychotically Related


Eye Contact: Poor





- Affect


Observed Affect: Depressed


Affect Consistent with: Dysphoria





- Thought Process


Thought Content: No Passive Death Wish - unable to assess, No Suicidal Planning 

- unable to assess, No Homicidal Ideation - unable to assess, No Paranoid 

Ideation - unable to assess





- Sensorium


Type of Hallucinations: Visual: No - unable to assess, Auditory: No - unable to 

assess, Command: No - unable to assess





- Level of Consciousness


Level of Consciousness: Alert


Orientation: No Intact, No Orientated to Time, No Orientated to Place, No 

Orientated to Person





- Impulse Control


Impulse Control: Impaired





- Insight and Judgement


Insight and Judgement: Impaired





- Group Participation


Particating in Group Activities: No





- Medication Management


Medication Management Adherence: No





Assessment





- Assessment


Merits Inpatient Hospitalization: For Immediate Safety, For Stabilization


Inpatient DSM-IV Dx: Schizophrenia





Plan





- Plan


Treatment Plan: 


Name: JYOTI STEEN                        


YOB: 1955                        


[id number]


[id number]








1. Continue admission to [facility] for safety and symptom mx.


2. Navane D/C'd due to patient's reported disdain for med and hx of med non-

compliance.


3. Patient has been med non-compliant and symptomatic since admission. 


    Hearing for treatment over objection scheduled for 9/22/17 at 9am.


4. Continue to encourage med compliance with Risperdal at 3mg po qhs for 

psychosis with plan to initiate Invega Sustenna prior to discharge.


5. Continue to encourage med compliance with Lithium at home dose of 1,200mg po 

qhs for mood.  


    Patient's Lithium level noted to be sub-therapeutic on admission at 0.10. 


6. Continue encouraged to take Xarelto 20mg po daily for anticoagulation 

therapy dx-Afib. Patient non-compliant on 


    Coumadin and Xarelto provides immediate anticoagulation.


7. Collateral information obtained from PCP-Dr. Rachid Luevano#[phone] and 

ACT team. 


8. Patient to participate in milieu activities and groups.


Medications: 


 Current Medications





Acetaminophen (Tylenol Tab*)  650 mg PO Q4H PRN


   PRN Reason: PAIN or TEMP > 101 F


Al Hydrox/Mg Hydrox/Simethicone (Maalox Plus*)  30 ml PO Q4H PRN


   PRN Reason: INDIGESTION


   Last Admin: 09/17/17 09:55 Dose:  30 ml


Benztropine Mesylate (Cogentin Tab*)  1 mg PO BID [facility]


   Last Admin: 09/21/17 09:06 Dose:  Not Given


Calcium/Vitamin D (Oscal D Tab 250/125*)  2 tab PO BID [facility]


   Last Admin: 09/21/17 09:06 Dose:  Not Given


Clonazepam (Klonopin Tab(*))  1 mg PO BEDTIME [facility]


   Last Admin: 09/20/17 20:20 Dose:  Not Given


Lithium Carbonate (Lithium Carbonate Tab*)  1,200 mg PO BEDTIME [facility]


   Last Admin: 09/20/17 20:20 Dose:  Not Given


Losartan Potassium (Cozaar Tab*)  25 mg PO DAILY [facility]


   Last Admin: 09/21/17 09:06 Dose:  Not Given


Melatonin (Melatonin (Nf))  3 mg PO BEDTIME [facility]


   Last Admin: 09/20/17 20:20 Dose:  Not Given


Melatonin (Melatonin (Nf))  1 tab PO 1700 [facility]


   Last Admin: 09/20/17 17:31 Dose:  Not Given


Multi-Ingredient Liniment/Rub (Eric De Luna*)  1 applic TOPICAL TID PRN


   PRN Reason: MUSCLE ACHE


   Last Admin: 09/19/17 11:32 Dose:  1 applic


Multivitamins (Theragran Tab*)  1 tab PO DAILY [facility]


   Last Admin: 09/21/17 09:06 Dose:  Not Given


Nicotine Polacrilex (Nicotine Gum*)  2 mg PO Q2H PRN


   PRN Reason: CRAVING


Polyethylene Glycol/Electrolytes (Miralax*)  17 gm PO DAILY [facility]


   Last Admin: 09/21/17 09:06 Dose:  Not Given


Risperidone (Risperdal*)  3 mg PO BEDTIME [facility]


   Last Admin: 09/20/17 20:20 Dose:  Not Given


Rivaroxaban (Xarelto (*))  20 mg PO DAILY [facility]


   Last Admin: 09/21/17 09:06 Dose:  Not Given


Senna (Senokot Tab*)  1 tab PO BID [facility]


   Last Admin: 09/21/17 09:06 Dose:  Not Given











- Discharge Plan


Discharge Plan: Outpatient Follow Up

## 2017-09-22 LAB
ALBUMIN SERPL BCG-MCNC: 4 G/DL (ref 3.2–5.2)
ALP SERPL-CCNC: 70 U/L (ref 34–104)
ALT SERPL W P-5'-P-CCNC: 20 U/L (ref 7–52)
ANION GAP SERPL CALC-SCNC: 5 MMOL/L (ref 2–11)
AST SERPL-CCNC: (no result) U/L (ref 13–39)
BUN SERPL-MCNC: 18 MG/DL (ref 6–24)
BUN/CREAT SERPL: 21.7 (ref 8–20)
CALCIUM SERPL-MCNC: 9.4 MG/DL (ref 8.6–10.3)
CHLORIDE SERPL-SCNC: 104 MMOL/L (ref 101–111)
CHOLEST SERPL-MCNC: 157 MG/DL
GLOBULIN SER CALC-MCNC: 2.8 G/DL (ref 2–4)
GLUCOSE SERPL-MCNC: 104 MG/DL (ref 70–100)
HCO3 SERPL-SCNC: 26 MMOL/L (ref 22–32)
HCT VFR BLD AUTO: 42 % (ref 42–52)
HDLC SERPL-MCNC: 49.2 MG/DL
HGB BLD-MCNC: 14.8 G/DL (ref 14–18)
MCH RBC QN AUTO: 34 PG (ref 27–31)
MCHC RBC AUTO-ENTMCNC: 36 G/DL (ref 31–36)
MCV RBC AUTO: 96 FL (ref 80–94)
POTASSIUM SERPL-SCNC: (no result) MMOL/L (ref 3.5–5)
PROT SERPL-MCNC: 6.8 G/DL (ref 6.4–8.9)
RBC # BLD AUTO: 4.3 10^6/UL (ref 4–5.4)
SODIUM SERPL-SCNC: 135 MMOL/L (ref 133–145)
TRIGL SERPL-MCNC: 194 MG/DL
WBC # BLD AUTO: 8.2 10^3/UL (ref 3.5–10.8)

## 2017-09-22 RX ADMIN — SENNOSIDES SCH: 8.6 TABLET, FILM COATED ORAL at 09:18

## 2017-09-22 RX ADMIN — SENNOSIDES SCH: 8.6 TABLET, FILM COATED ORAL at 20:39

## 2017-09-22 RX ADMIN — Medication SCH: at 09:17

## 2017-09-22 RX ADMIN — RIVAROXABAN SCH: 20 TABLET, FILM COATED ORAL at 09:18

## 2017-09-22 RX ADMIN — POLYETHYLENE GLYCOL 3350 SCH: 17 POWDER, FOR SOLUTION ORAL at 09:18

## 2017-09-22 RX ADMIN — Medication SCH: at 20:39

## 2017-09-22 RX ADMIN — BENZTROPINE MESYLATE SCH: 1 TABLET ORAL at 09:17

## 2017-09-22 RX ADMIN — CLONAZEPAM SCH MG: 1 TABLET ORAL at 20:39

## 2017-09-22 RX ADMIN — LITHIUM CARBONATE SCH MG: 300 TABLET ORAL at 20:37

## 2017-09-22 RX ADMIN — Medication SCH: at 20:38

## 2017-09-22 RX ADMIN — THERA TABS SCH: TAB at 09:18

## 2017-09-22 RX ADMIN — BENZTROPINE MESYLATE SCH MG: 1 TABLET ORAL at 20:38

## 2017-09-22 RX ADMIN — RISPERIDONE SCH MG: 3 TABLET ORAL at 20:38

## 2017-09-22 RX ADMIN — LOSARTAN POTASSIUM SCH: 25 TABLET, FILM COATED ORAL at 09:18

## 2017-09-22 NOTE — PN
Subjective





- Subjective


Service Type: [id number] Hosp care 15 min low complexity


Subjective: 





Patient withdrawn with sullen affect in TOO hearing this morning. Patient has 

attended to ADLs, is up for meals, 


and is able to verbalize his needs to staff. Patient is noted to have good 

appetite and getting good sleep. Baseline


labs obtained. Patient was compliant with current med regimen Rx'd and now 

court ordered.








Objective





- Appearance


Appearance: Well Developed/Nourished


Dysmorphic Features: No


Hygiene: Normal


Grooming: Fairly Well Kept





- Behavior


Psychomotor Activities: Normal


Exhibits Abnormal Movement: No





- Attitude and Relatedness


Attitude and Relatedness: Minimally Cooperative


Eye Contact: Poor





- Speech


Quality: Unpressured


Latencies: Normal


Quantity: Terse





- Mood


Patient's Decription of Mood: "Irritable"





- Affect


Observed Affect: Depressed


Affect Consistent with: Dysphoria





- Thought Process


Patient's Thought Process: Impoverished


Thought Content: No Passive Death Wish - unable to assess, No Suicidal Planning 

- unable to assess, No Homicidal Ideation - unable to assess, No Paranoid 

Ideation - unable to assess





- Sensorium


Type of Hallucinations: Visual: No - unable to assess, Auditory: No - unable to 

assess, Command: No - unable to assess





- Level of Consciousness


Level of Consciousness: Alert


Orientation: Yes Intact, Yes Orientated to Time, Yes Orientated to Place, Yes 

Orientated to Person





- Impulse Control


Impulse Control: Impaired





- Insight and Judgement


Insight and Judgement: Impaired





- Group Participation


Particating in Group Activities: No





- Medication Management


Medication Management Adherence: Yes





Assessment





- Assessment


Merits Inpatient Hospitalization: For Immediate Safety, For Stabilization


Inpatient DSM-IV Dx: Schizophrenia





Plan





- Plan


Treatment Plan: 


Name: JYOTI STEEN                        


YOB: 1955                        


[id number]


[id number]








1. Continue admission to [facility] for safety and symptom mx.


2. Navane D/C'd due to patient's reported disdain for med and hx of med non-

compliance.


3. Hearing for treatment over objection completed 9/22/17.


4. Continue TOO Risperdal at 3mg po qhs for psychosis with plan to initiate 

Invega Sustenna prior to discharge.


5. Continue TOO Lithium at 600mg po qhs for mood stabilization/augmentation. 


6. Continue TOO  Xarelto 20mg po daily for anticoagulation therapy dx-Afib.  


7. Collateral information obtained from PCP-Dr. Rachid Luevano#[phone] and 

ACT team. 


8. Patient to participate in milieu activities and groups.


Medications: 


 Current Medications





Acetaminophen (Tylenol Tab*)  650 mg PO Q4H PRN


   PRN Reason: PAIN or TEMP > 101 F


Al Hydrox/Mg Hydrox/Simethicone (Maalox Plus*)  30 ml PO Q4H PRN


   PRN Reason: INDIGESTION


   Last Admin: 09/17/17 09:55 Dose:  30 ml


Benztropine Mesylate (Cogentin Tab*)  [facility]ID [facility]


   Last Admin: 09/22/17 09:17 Dose:  Not Given


Calcium/Vitamin D (Oscal D Tab 250/125*)  2 tab [facility] BID [facility]


   Last Admin: 09/22/17 09:17 Dose:  Not Given


Clonazepam (Klonopin Tab(*))  1 mg PO BEDTIME [facility]


   Last Admin: 09/21/17 21:30 Dose:  Not Given


Lithium Carbonate (Lithium Carbonate Tab*)  1,200 mg PO BEDTIME [facility]


   Last Admin: 09/21/17 21:31 Dose:  Not Given


Losartan Potassium (Cozaar Tab*)  25 mg PO DAILY [facility]


   Last Admin: 09/22/17 09:18 Dose:  Not Given


Melatonin (Melatonin (Nf))  3 mg PO BEDTIME [facility]


   Last Admin: 09/21/17 21:31 Dose:  Not Given


Melatonin (Melatonin (Nf))  1 tab PO 1700 [facility]


   Last Admin: 09/21/17 17:12 Dose:  Not Given


Multi-Ingredient Liniment/Rub (Eric De Luna*)  1 applic TOPICAL TID PRN


   PRN Reason: MUSCLE ACHE


   Last Admin: 09/19/17 11:32 Dose:  1 applic


Multivitamins (Theragran Tab*)  1 tab PO DAILY [facility]


   Last Admin: 09/22/17 09:18 Dose:  Not Given


Nicotine Polacrilex (Nicotine Gum*)  2 mg PO Q2H PRN


   PRN Reason: CRAVING


Polyethylene Glycol/Electrolytes (Miralax*)  17 gm PO DAILY [facility]


   Last Admin: 09/22/17 09:18 Dose:  Not Given


Risperidone (Risperdal*)  3 mg PO BEDTIME [facility]


   Last Admin: 09/21/17 21:31 Dose:  Not Given


Rivaroxaban (Xarelto (*))  20 mg PO DAILY [facility]


   Last Admin: 09/22/17 09:18 Dose:  Not Given


Senna (Senokot Tab*)  1 tab [facility] BID [facility]


   Last Admin: 09/22/17 09:18 Dose:  Not Given











- Discharge Plan


Discharge Plan: Outpatient Follow Up

## 2017-09-23 RX ADMIN — THERA TABS SCH: TAB at 09:31

## 2017-09-23 RX ADMIN — CLONAZEPAM SCH MG: 1 TABLET ORAL at 20:56

## 2017-09-23 RX ADMIN — SENNOSIDES SCH: 8.6 TABLET, FILM COATED ORAL at 20:58

## 2017-09-23 RX ADMIN — BENZTROPINE MESYLATE SCH MG: 1 TABLET ORAL at 20:55

## 2017-09-23 RX ADMIN — Medication SCH: at 20:56

## 2017-09-23 RX ADMIN — LITHIUM CARBONATE SCH MG: 300 TABLET ORAL at 20:54

## 2017-09-23 RX ADMIN — POLYETHYLENE GLYCOL 3350 SCH: 17 POWDER, FOR SOLUTION ORAL at 09:31

## 2017-09-23 RX ADMIN — LOSARTAN POTASSIUM SCH: 25 TABLET, FILM COATED ORAL at 09:31

## 2017-09-23 RX ADMIN — RIVAROXABAN SCH MG: 20 TABLET, FILM COATED ORAL at 09:30

## 2017-09-23 RX ADMIN — RISPERIDONE SCH MG: 3 TABLET ORAL at 20:55

## 2017-09-23 RX ADMIN — BENZTROPINE MESYLATE SCH MG: 1 TABLET ORAL at 09:30

## 2017-09-23 RX ADMIN — SENNOSIDES SCH: 8.6 TABLET, FILM COATED ORAL at 09:31

## 2017-09-23 RX ADMIN — Medication SCH: at 09:31

## 2017-09-24 RX ADMIN — LITHIUM CARBONATE SCH MG: 300 TABLET ORAL at 19:54

## 2017-09-24 RX ADMIN — BENZTROPINE MESYLATE SCH MG: 1 TABLET ORAL at 08:05

## 2017-09-24 RX ADMIN — Medication SCH: at 20:00

## 2017-09-24 RX ADMIN — THERA TABS SCH: TAB at 08:06

## 2017-09-24 RX ADMIN — SENNOSIDES SCH: 8.6 TABLET, FILM COATED ORAL at 08:06

## 2017-09-24 RX ADMIN — RISPERIDONE SCH MG: 3 TABLET ORAL at 19:54

## 2017-09-24 RX ADMIN — POLYETHYLENE GLYCOL 3350 SCH: 17 POWDER, FOR SOLUTION ORAL at 08:06

## 2017-09-24 RX ADMIN — SENNOSIDES SCH: 8.6 TABLET, FILM COATED ORAL at 20:00

## 2017-09-24 RX ADMIN — LOSARTAN POTASSIUM SCH: 25 TABLET, FILM COATED ORAL at 08:06

## 2017-09-24 RX ADMIN — RIVAROXABAN SCH MG: 20 TABLET, FILM COATED ORAL at 08:04

## 2017-09-24 RX ADMIN — Medication SCH: at 08:06

## 2017-09-24 RX ADMIN — CLONAZEPAM SCH MG: 1 TABLET ORAL at 19:54

## 2017-09-24 RX ADMIN — BENZTROPINE MESYLATE SCH MG: 1 TABLET ORAL at 19:55

## 2017-09-25 RX ADMIN — Medication SCH: at 20:16

## 2017-09-25 RX ADMIN — SENNOSIDES SCH: 8.6 TABLET, FILM COATED ORAL at 20:16

## 2017-09-25 RX ADMIN — RISPERIDONE SCH MG: 3 TABLET ORAL at 20:11

## 2017-09-25 RX ADMIN — CLONAZEPAM SCH MG: 1 TABLET ORAL at 20:17

## 2017-09-25 RX ADMIN — BENZTROPINE MESYLATE SCH MG: 1 TABLET ORAL at 09:09

## 2017-09-25 RX ADMIN — RIVAROXABAN SCH MG: 20 TABLET, FILM COATED ORAL at 09:10

## 2017-09-25 RX ADMIN — THERA TABS SCH: TAB at 09:11

## 2017-09-25 RX ADMIN — Medication SCH: at 09:10

## 2017-09-25 RX ADMIN — SENNOSIDES SCH: 8.6 TABLET, FILM COATED ORAL at 09:11

## 2017-09-25 RX ADMIN — LITHIUM CARBONATE SCH MG: 300 TABLET ORAL at 20:11

## 2017-09-25 RX ADMIN — BENZTROPINE MESYLATE SCH MG: 1 TABLET ORAL at 20:11

## 2017-09-25 RX ADMIN — LOSARTAN POTASSIUM SCH: 25 TABLET, FILM COATED ORAL at 09:10

## 2017-09-25 RX ADMIN — POLYETHYLENE GLYCOL 3350 SCH: 17 POWDER, FOR SOLUTION ORAL at 09:10

## 2017-09-25 NOTE — PN
Subjective





- Subjective


Service Type: [id number] Hosp care 15 min low complexity


Subjective: 





Patient paranoid, somewhat uncooperative, although he has been taking meds over 

the weekend.  Requests staff pass.  When asked about his anticoagulant, Xeralto

, he responds "I'm on an antipsychotic, not an anticoagulant" and storms away.





Objective





- Appearance


Appearance: Well Developed/Nourished


Dysmorphic Features: No


Hygiene: Normal


Grooming: Well Kept





- Behavior


Psychomotor Activities: Normal


Exhibits Abnormal Movement: No





- Attitude and Relatedness


Attitude and Relatedness: Dismissive


Eye Contact: Poor





- Speech


Quality: Unpressured


Latencies: Normal


Quantity: Terse





- Mood


Patient's Decription of Mood: "Fine"





- Affect


Observed Affect: Fair


Affect Consistent with: Euthymia





- Thought Process


Patient's Thought Process: Circumstantial


Thought Content: Yes Paranoid Ideation, No Passive Death Wish, No Suicidal 

Planning, No Homicidal Ideation





- Sensorium


Experiencing Hallucinations: No, Sensorium is Clear


Type of Hallucinations: Visual: No, Auditory: No, Command: No





- Level of Consciousness


Level of Consciousness: Alert


Orientation: Yes Intact, Yes Orientated to Time, Yes Orientated to Place, Yes 

Orientated to Person





- Impulse Control


Impulse Control: Tenuous





- Insight and Judgement


Insight and Judgement: Fair





- Group Participation


Particating in Group Activities: No





- Medication Management


Medication Management Adherence: Yes





Assessment





- Assessment


Merits Inpatient Hospitalization: For Immediate Safety, For Stabilization


Inpatient DSM-IV Dx: Schizophrenia


Clinical Impression: 





62 y.o. single, white male with persistent and severe psychotic disorder, 

brought in by police after violating the AOT order secondary to refusing 

medications or to follow up with the ACT team, resulting in resumption of 

psychotic symptoms and inability to care for himself.





Plan





- Plan


Treatment Plan: 


Name: JYOTI STEEN                        


YOB: 1955                        


[id number]


[id number]








Patient back on outpatient meds.  Will check PT/PTT/INR as well as metabolic 

labs.  Continue inpatient treatment in secured setting.


Continued Medication Management: Continue Outpt Medication


Medications: 


 Current Medications





Acetaminophen (Tylenol Tab*)  650 mg PO Q4H PRN


   PRN Reason: PAIN or TEMP > 101 F


Al Hydrox/Mg Hydrox/Simethicone (Maalox Plus*)  30 ml PO Q4H PRN


   PRN Reason: INDIGESTION


   Last Admin: 09/17/17 09:55 Dose:  30 ml


Benztropine Mesylate (Cogentin Tab*)  1 mg PO BID [facility]


   Last Admin: 09/25/17 09:09 Dose:  1 mg


Calcium/Vitamin D (Oscal D Tab 250/125*)  2 tab PO BID [facility]


   Last Admin: 09/25/17 09:10 Dose:  Not Given


Clonazepam (Klonopin Tab(*))  1 mg PO BEDTIME [facility]


   Last Admin: 09/24/17 19:54 Dose:  1 mg


Lithium Carbonate (Lithium Carbonate Tab*)  600 mg PO BEDTIME [facility]


   Last Admin: 09/24/17 19:54 Dose:  600 mg


Losartan Potassium (Cozaar Tab*)  25 mg PO DAILY [facility]


   Last Admin: 09/25/17 09:10 Dose:  Not Given


Melatonin (Melatonin (Nf))  3 mg PO BEDTIME [facility]


   Last Admin: 09/24/17 20:00 Dose:  Not Given


Multi-Ingredient Liniment/Rub (Eric De Luna*)  1 applic TOPICAL TID PRN


   PRN Reason: MUSCLE ACHE


   Last Admin: 09/19/17 11:32 Dose:  1 applic


Multivitamins (Theragran Tab*)  1 tab PO DAILY [facility]


   Last Admin: 09/25/17 09:11 Dose:  Not Given


Nicotine Polacrilex (Nicotine Gum*)  2 mg PO Q2H PRN


   PRN Reason: CRAVING


Polyethylene Glycol/Electrolytes (Miralax*)  17 gm PO DAILY [facility]


   Last Admin: 09/25/17 09:10 Dose:  Not Given


Risperidone (Risperdal*)  3 mg PO BEDTIME [facility]


   Last Admin: 09/24/17 19:54 Dose:  3 mg


Rivaroxaban (Xarelto (*))  20 mg PO DAILY [facility]


   Last Admin: 09/25/17 09:10 Dose:  20 mg


Senna (Senokot Tab*)  1 tab PO BID [facility]


   Last Admin: 09/25/17 09:11 Dose:  Not Given











- Discharge Plan


Discharge Plan: Inpatient Hospitalization

## 2017-09-26 RX ADMIN — ALUMINUM HYDROXIDE, MAGNESIUM HYDROXIDE, AND SIMETHICONE PRN ML: 200; 200; 20 SUSPENSION ORAL at 14:06

## 2017-09-26 RX ADMIN — LITHIUM CARBONATE SCH MG: 300 TABLET ORAL at 20:14

## 2017-09-26 RX ADMIN — THERA TABS SCH: TAB at 08:58

## 2017-09-26 RX ADMIN — SENNOSIDES SCH: 8.6 TABLET, FILM COATED ORAL at 20:17

## 2017-09-26 RX ADMIN — RIVAROXABAN SCH MG: 20 TABLET, FILM COATED ORAL at 08:58

## 2017-09-26 RX ADMIN — LOSARTAN POTASSIUM SCH: 25 TABLET, FILM COATED ORAL at 08:57

## 2017-09-26 RX ADMIN — Medication SCH: at 20:17

## 2017-09-26 RX ADMIN — SENNOSIDES SCH: 8.6 TABLET, FILM COATED ORAL at 08:58

## 2017-09-26 RX ADMIN — BENZTROPINE MESYLATE SCH MG: 1 TABLET ORAL at 20:15

## 2017-09-26 RX ADMIN — BENZTROPINE MESYLATE SCH MG: 1 TABLET ORAL at 08:55

## 2017-09-26 RX ADMIN — POLYETHYLENE GLYCOL 3350 SCH: 17 POWDER, FOR SOLUTION ORAL at 08:57

## 2017-09-26 RX ADMIN — Medication SCH: at 08:57

## 2017-09-26 RX ADMIN — RISPERIDONE SCH MG: 3 TABLET ORAL at 20:15

## 2017-09-26 RX ADMIN — CLONAZEPAM SCH MG: 1 TABLET ORAL at 20:15

## 2017-09-26 NOTE — PN
Subjective





- Subjective


Service Type: 62823 Hosp care 15 min low complexity


Subjective: 





Patient visible in the milieu. Patient isolated and does not attend groups. 

Patient not interested in talking to this provider.


This provider stated to the patient, "I don't understand why you don't talk to 

me". Patient replied,"You know what you've


done to me". Patient then walked away from the conversation. Patient noted to 

be up for meals and performing ADLs


without prompting. Patient has been med compliant since court last Friday. 





Objective





- Appearance


Appearance: Well Developed/Nourished, Thin Framed


Dysmorphic Features: No


Hygiene: Normal


Grooming: Fairly Well Kept





- Behavior


Psychomotor Activities: Normal


Exhibits Abnormal Movement: No





- Attitude and Relatedness


Attitude and Relatedness: Psychotically Related


Eye Contact: Poor





- Speech


Quality: Unpressured


Latencies: Normal


Quantity: Terse





- Mood


Patient's Decription of Mood: unable to assess





- Affect


Observed Affect: Tense


Affect Consistent with: Dysphoria





- Thought Process


Patient's Thought Process: Coherent


Thought Content: No Passive Death Wish - unable to assess, No Suicidal Planning 

- unable to assess, No Homicidal Ideation - unable to assess, No Paranoid 

Ideation - unable to assess





- Sensorium


Experiencing Hallucinations: No, Sensorium is Clear


Type of Hallucinations: Visual: No - unable to assess, Auditory: No - unable to 

assess, Command: No - unable to assess





- Level of Consciousness


Level of Consciousness: Alert


Orientation: Yes Intact, Yes Orientated to Time, Yes Orientated to Place, Yes 

Orientated to Person





- Impulse Control


Impulse Control: Intact





- Insight and Judgement


Insight and Judgement: Poor





- Group Participation


Particating in Group Activities: No





- Medication Management


Medication Management Adherence: Yes





Assessment





- Assessment


Merits Inpatient Hospitalization: For Immediate Safety, For Stabilization


Inpatient DSM-IV Dx: Schizophrenia





Plan





- Plan


Treatment Plan: 


Name: JYOTI STEEN                        


YOB: 1955                        


[id number]


[id number]








1. Continue admission to [facility] for safety and symptom mx.


2. Navane D/C'd due to patient's reported disdain for med and hx of med non-

compliance.


3. Hearing for treatment over objection completed 9/22/17.


4. Continue TOO Risperdal, will increase dose from 3mg to 6mg po qhs for 

psychosis with plan to initiate Invega Sustenna prior to discharge.


5. Continue TOO Lithium at 600mg po qhs for mood stabilization/augmentation. 


6. Continue TOO Xarelto 20mg po daily for anticoagulation therapy dx-Afib.  


7. Collateral information obtained from PCP-Dr. Rachid Luevano#[phone] and 

ACT team. 


8. Patient to participate in milieu activities and groups.


Medications: 


 Current Medications





Acetaminophen (Tylenol Tab*)  650 mg PO Q4H PRN


   PRN Reason: PAIN or TEMP > 101 F


Al Hydrox/Mg Hydrox/Simethicone (Maalox Plus*)  30 ml PO Q4H PRN


   PRN Reason: INDIGESTION


   Last Admin: 09/26/17 14:06 Dose:  30 ml


Benztropine Mesylate (Cogentin Tab*)  1 mg PO BID [facility]


   Last Admin: 09/26/17 08:55 Dose:  1 mg


Calcium/Vitamin D (Oscal D Tab 250/125*)  2 tab PO BID [facility]


   Last Admin: 09/26/17 08:57 Dose:  Not Given


Clonazepam (Klonopin Tab(*))  1 mg PO BEDTIME [facility]


   Last Admin: 09/25/17 20:17 Dose:  1 mg


Lithium Carbonate (Lithium Carbonate Tab*)  600 mg PO BEDTIME [facility]


   Last Admin: 09/25/17 20:11 Dose:  600 mg


Losartan Potassium (Cozaar Tab*)  25 mg PO DAILY [facility]


   Last Admin: 09/26/17 08:57 Dose:  Not Given


Melatonin (Melatonin (Nf))  3 mg PO BEDTIME [facility]


   Last Admin: 09/25/17 20:16 Dose:  Not Given


Multi-Ingredient Liniment/Rub (Eric De Luna*)  1 applic TOPICAL TID PRN


   PRN Reason: MUSCLE ACHE


   Last Admin: 09/19/17 11:32 Dose:  1 applic


Multivitamins (Theragran Tab*)  1 tab PO DAILY [facility]


   Last Admin: 09/26/17 08:58 Dose:  Not Given


Nicotine Polacrilex (Nicotine Gum*)  2 mg PO Q2H PRN


   PRN Reason: CRAVING


Polyethylene Glycol/Electrolytes (Miralax*)  17 gm PO DAILY [facility]


   Last Admin: 09/26/17 08:57 Dose:  Not Given


Risperidone (Risperdal*)  3 mg PO BEDTIME [facility]


   Last Admin: 09/25/17 20:11 Dose:  3 mg


Rivaroxaban (Xarelto (*))  20 mg PO DAILY [facility]


   Last Admin: 09/26/17 08:58 Dose:  20 mg


Senna (Senokot Tab*)  1 tab PO BID [facility]


   Last Admin: 09/26/17 08:58 Dose:  Not Given











- Discharge Plan


Discharge Plan: Outpatient Follow Up


Outpatient Program: [facility]

## 2017-09-27 LAB
ALBUMIN SERPL BCG-MCNC: 4 G/DL (ref 3.2–5.2)
ALP SERPL-CCNC: 69 U/L (ref 34–104)
ALT SERPL W P-5'-P-CCNC: 18 U/L (ref 7–52)
ANION GAP SERPL CALC-SCNC: 4 MMOL/L (ref 2–11)
AST SERPL-CCNC: 18 U/L (ref 13–39)
BUN SERPL-MCNC: 14 MG/DL (ref 6–24)
BUN/CREAT SERPL: 15.7 (ref 8–20)
CALCIUM SERPL-MCNC: 9.3 MG/DL (ref 8.6–10.3)
CHLORIDE SERPL-SCNC: 107 MMOL/L (ref 101–111)
CHOLEST SERPL-MCNC: 146 MG/DL
GLOBULIN SER CALC-MCNC: 2.6 G/DL (ref 2–4)
GLUCOSE SERPL-MCNC: 101 MG/DL (ref 70–100)
HCO3 SERPL-SCNC: 27 MMOL/L (ref 22–32)
HCT VFR BLD AUTO: 41 % (ref 42–52)
HDLC SERPL-MCNC: 51.4 MG/DL
HGB BLD-MCNC: 14.5 G/DL (ref 14–18)
LITHIUM SERPL-SCNC: 0.4 MMOL/L (ref 0.6–1.2)
MCH RBC QN AUTO: 34 PG (ref 27–31)
MCHC RBC AUTO-ENTMCNC: 36 G/DL (ref 31–36)
MCV RBC AUTO: 97 FL (ref 80–94)
POTASSIUM SERPL-SCNC: 4.1 MMOL/L (ref 3.5–5)
PROT SERPL-MCNC: 6.6 G/DL (ref 6.4–8.9)
RBC # BLD AUTO: 4.22 10^6/UL (ref 4–5.4)
SODIUM SERPL-SCNC: 138 MMOL/L (ref 133–145)
TRIGL SERPL-MCNC: 36 MG/DL
WBC # BLD AUTO: 4.7 10^3/UL (ref 3.5–10.8)

## 2017-09-27 RX ADMIN — Medication SCH: at 23:10

## 2017-09-27 RX ADMIN — THERA TABS SCH TAB: TAB at 09:22

## 2017-09-27 RX ADMIN — Medication SCH: at 20:15

## 2017-09-27 RX ADMIN — SENNOSIDES SCH TAB: 8.6 TABLET, FILM COATED ORAL at 09:22

## 2017-09-27 RX ADMIN — BENZTROPINE MESYLATE SCH MG: 1 TABLET ORAL at 20:10

## 2017-09-27 RX ADMIN — LITHIUM CARBONATE SCH MG: 300 TABLET ORAL at 20:10

## 2017-09-27 RX ADMIN — RISPERIDONE SCH MG: 3 TABLET ORAL at 20:10

## 2017-09-27 RX ADMIN — BENZTROPINE MESYLATE SCH MG: 1 TABLET ORAL at 09:23

## 2017-09-27 RX ADMIN — SENNOSIDES SCH: 8.6 TABLET, FILM COATED ORAL at 20:15

## 2017-09-27 RX ADMIN — RIVAROXABAN SCH MG: 20 TABLET, FILM COATED ORAL at 09:22

## 2017-09-27 RX ADMIN — POLYETHYLENE GLYCOL 3350 SCH GM: 17 POWDER, FOR SOLUTION ORAL at 09:21

## 2017-09-27 RX ADMIN — CLONAZEPAM SCH MG: 1 TABLET ORAL at 20:10

## 2017-09-27 RX ADMIN — Medication SCH TAB: at 09:23

## 2017-09-27 RX ADMIN — LOSARTAN POTASSIUM SCH MG: 25 TABLET, FILM COATED ORAL at 09:22

## 2017-09-27 NOTE — PN
Subjective





- Subjective


Service Type: [id number] Hosp care 15 min low complexity


Subjective: 





Patient again declines to talk to this provider. Patient noted to be med 

compliant with increased dose of Risperdal which started last night.


He was also compliant with lab work this morning. Lithium level was 0.40. 

Patient informed his Lithium dose would be increased to 900mg po qhs. Patient 

continues to be visible in the milieu, mostly isolating. He is noted to have 

good appetite and sleep. Patient is able to verbalize his needs. Patient asked 

if he's having med s/e's, he replied he was not. Patient asked if he had issues 

with constipation or diarrhea. He reported that he did not. Patient then walked 

away from the conversation.





Objective





- Appearance


Appearance: Well Developed/Nourished


Dysmorphic Features: No


Hygiene: Normal


Grooming: Well Kept





- Behavior


Psychomotor Activities: Normal


Exhibits Abnormal Movement: No





- Attitude and Relatedness


Attitude and Relatedness: Psychotically Related


Eye Contact: Poor





- Speech


Quality: Unpressured


Latencies: Normal


Quantity: Terse





- Mood


Patient's Decription of Mood: "Okay"





- Affect


Observed Affect: Constricted


Affect Consistent with: Dysphoria





- Thought Process


Patient's Thought Process: Impoverished


Thought Content: Yes Paranoid Ideation, No Passive Death Wish, No Suicidal 

Planning, No Homicidal Ideation





- Sensorium


Experiencing Hallucinations: No, Sensorium is Clear


Type of Hallucinations: Visual: No, Auditory: No, Command: No





- Level of Consciousness


Level of Consciousness: Alert


Orientation: Yes Intact, Yes Orientated to Time, Yes Orientated to Place, Yes 

Orientated to Person





- Impulse Control


Impulse Control: Intact





- Insight and Judgement


Insight and Judgement: Poor





- Group Participation


Particating in Group Activities: No





- Medication Management


Medication Management Adherence: Yes





Assessment





- Assessment


Merits Inpatient Hospitalization: For Immediate Safety, For Stabilization


Inpatient DSM-IV Dx: Schizophrenia





Plan





- Plan


Treatment Plan: 


Name: JYOTI STEEN                        


YOB: 1955                        


[id number]


[id number]








1. Continue admission to [facility] for safety and symptom mx.


2. Navane D/C'd due to patient's reported disdain for med and hx of med non-

compliance.


3. Hearing for treatment over objection completed 9/22/17.


4. Continue TOO Risperdal at 6mg po qhs for psychosis with plan to initiate 

Invega Sustenna prior to discharge.


5. Increase TOO Lithium from 600mg to 900mg po qhs for mood stabilization/

augmentation. 


6. Continue TOO Xarelto 20mg po daily for anticoagulation therapy dx-Afib.  


7. Collateral information obtained from PCP-Dr. Rachid Luevano#[phone] and 

ACT team. 


8. Patient to participate in milieu activities and groups.


Medications: 


 Current Medications





Acetaminophen (Tylenol Tab*)  650 mg PO Q4H PRN


   PRN Reason: PAIN or TEMP > 101 F


Al Hydrox/Mg Hydrox/Simethicone (Maalox Plus*)  30 ml PO Q4H PRN


   PRN Reason: INDIGESTION


   Last Admin: 09/26/17 14:06 Dose:  30 ml


Benztropine Mesylate (Cogentin Tab*)  1 mg PO BID [facility]


   Last Admin: 09/27/17 09:23 Dose:  1 mg


Calcium/Vitamin D (Oscal D Tab 250/125*)  2 tab PO BID [facility]


   Last Admin: 09/27/17 09:23 Dose:  2 tab


Clonazepam (Klonopin Tab(*))  1 mg PO BEDTIME [facility]


   Last Admin: 09/26/17 20:15 Dose:  1 mg


Lithium Carbonate (Lithium Carbonate Tab*)  600 mg PO BEDTIME [facility]


   Last Admin: 09/26/17 20:14 Dose:  600 mg


Losartan Potassium (Cozaar Tab*)  25 mg PO DAILY [facility]


   Last Admin: 09/27/17 09:22 Dose:  25 mg


Melatonin (Melatonin (Nf))  3 mg PO BEDTIME [facility]


   Last Admin: 09/26/17 20:17 Dose:  Not Given


Multi-Ingredient Liniment/Rub (Eric De Luna*)  1 applic TOPICAL TID PRN


   PRN Reason: MUSCLE ACHE


   Last Admin: 09/19/17 11:32 Dose:  1 applic


Multivitamins (Theragran Tab*)  1 tab PO DAILY [facility]


   Last Admin: 09/27/17 09:22 Dose:  1 tab


Nicotine Polacrilex (Nicotine Gum*)  2 mg PO Q2H PRN


   PRN Reason: CRAVING


Polyethylene Glycol/Electrolytes (Miralax*)  17 gm PO DAILY [facility]


   Last Admin: 09/27/17 09:21 Dose:  17 gm


Risperidone (Risperdal*)  6 mg PO BEDTIME [facility]


   Last Admin: 09/26/17 20:15 Dose:  6 mg


Rivaroxaban (Xarelto (*))  20 mg PO DAILY [facility]


   Last Admin: 09/27/17 09:22 Dose:  20 mg


Senna (Senokot Tab*)  1 tab PO BID [facility]


   Last Admin: 09/27/17 09:22 Dose:  1 tab











- Discharge Plan


Discharge Plan: Outpatient Follow Up


Outpatient Program: [facility] team

## 2017-09-28 RX ADMIN — Medication SCH: at 21:05

## 2017-09-28 RX ADMIN — BENZTROPINE MESYLATE SCH MG: 1 TABLET ORAL at 09:43

## 2017-09-28 RX ADMIN — THERA TABS SCH TAB: TAB at 09:43

## 2017-09-28 RX ADMIN — BENZTROPINE MESYLATE SCH MG: 1 TABLET ORAL at 21:04

## 2017-09-28 RX ADMIN — SENNOSIDES SCH TAB: 8.6 TABLET, FILM COATED ORAL at 09:43

## 2017-09-28 RX ADMIN — CLONAZEPAM SCH MG: 1 TABLET ORAL at 21:04

## 2017-09-28 RX ADMIN — POLYETHYLENE GLYCOL 3350 SCH GM: 17 POWDER, FOR SOLUTION ORAL at 11:27

## 2017-09-28 RX ADMIN — SENNOSIDES SCH: 8.6 TABLET, FILM COATED ORAL at 21:05

## 2017-09-28 RX ADMIN — RIVAROXABAN SCH MG: 20 TABLET, FILM COATED ORAL at 09:43

## 2017-09-28 RX ADMIN — LOSARTAN POTASSIUM SCH MG: 25 TABLET, FILM COATED ORAL at 09:43

## 2017-09-28 RX ADMIN — Medication SCH TAB: at 09:45

## 2017-09-28 RX ADMIN — MENTHOL, METHYL SALICYLATE PRN APPLIC: 10; 15 CREAM TOPICAL at 11:35

## 2017-09-28 RX ADMIN — RISPERIDONE SCH MG: 3 TABLET ORAL at 21:03

## 2017-09-28 RX ADMIN — LITHIUM CARBONATE SCH MG: 300 TABLET ORAL at 21:04

## 2017-09-28 NOTE — PN
Subjective





- Subjective


Service Type: [id number] Hosp care 15 min low complexity


Subjective: 





Patient approached this provider, interested in his recent blood draw lab 

results. He sat and allowed this provider to review his Lithium level, CBCw/diff

, CMP, 


Lipid panel, A1C, and PT/INR results. He was happy they were all wnl. He was 

med compliant with last night's increased dose of Lithium to 900mg poqhs. 

Lithium level was 0.40. Patient has also bee compliant with Risperdal and 

Xarelto. Patient reports fair sleep and appetite. He was eager to end the 

conversation as


dinner was being served. Patient denied SI/HI and AH/VH.











Objective





- Appearance


Appearance: Well Developed/Nourished, Thin Framed


Dysmorphic Features: No


Hygiene: Normal


Grooming: Fairly Well Kept





- Behavior


Psychomotor Activities: Normal


Exhibits Abnormal Movement: No





- Attitude and Relatedness


Attitude and Relatedness: Minimally Cooperative


Eye Contact: Poor





- Speech


Quality: Unpressured


Latencies: Normal


Quantity: Appropriate





- Mood


Patient's Decription of Mood: "Fine"





- Affect


Observed Affect: Constricted


Affect Consistent with: Euthymia





- Thought Process


Patient's Thought Process: Coherent


Thought Content: Yes Paranoid Ideation, No Passive Death Wish, No Suicidal 

Planning, No Homicidal Ideation





- Sensorium


Experiencing Hallucinations: No, Sensorium is Clear


Type of Hallucinations: Visual: No, Auditory: No, Command: No





- Level of Consciousness


Level of Consciousness: Alert


Orientation: Yes Intact, Yes Orientated to Time, Yes Orientated to Place, Yes 

Orientated to Person





- Impulse Control


Impulse Control: Intact





- Insight and Judgement


Insight and Judgement: Fair





- Group Participation


Particating in Group Activities: No





- Medication Management


Medication Management Adherence: Yes





Assessment





- Assessment


Merits Inpatient Hospitalization: For Immediate Safety, For Stabilization


Inpatient DSM-IV Dx: Schizophrenia





Plan





- Plan


Treatment Plan: 


Name: JYOTI STEEN                        


YOB: 1955                        


[id number]


[id number]








1. Continue admission to [facility] for safety and symptom mx.


2. Navane D/C'd due to patient's reported disdain for med and hx of med non-

compliance.


3. Hearing for treatment over objection completed 9/22/17.


4. Continue TOO Risperdal at 6mg po qhs for psychosis with plan to initiate 

Invega Sustenna prior to discharge.


5. Continue TOO Lithium 900mg po qhs for mood stabilization/augmentation. 


6. Continue TOO Xarelto 20mg po daily for anticoagulation therapy dx-Afib.  


7. Obtain information on patient's baseline from ACT, Home care giver, or from 

past [facility] provider.


8. Collateral information obtained from PCP-Dr. Rachid Luevano#[phone] and 

ACT team. 


9. Patient to participate in milieu activities and groups.


Continued Medication Management: Different Medication


Medications: 


 Current Medications





Acetaminophen (Tylenol Tab*)  650 mg PO Q4H PRN


   PRN Reason: PAIN or TEMP > 101 F


Al Hydrox/Mg Hydrox/Simethicone (Maalox Plus*)  30 ml PO Q4H PRN


   PRN Reason: INDIGESTION


   Last Admin: 09/26/17 14:06 Dose:  30 ml


Benztropine Mesylate (Cogentin Tab*)  1 mg PO BID [facility]


   Last Admin: 09/28/17 09:43 Dose:  1 mg


Calcium/Vitamin D (Oscal D Tab 250/125*)  2 tab PO BID [facility]


   Last Admin: 09/28/17 09:45 Dose:  2 tab


Clonazepam (Klonopin Tab(*))  1 mg PO BEDTIME [facility]


   Last Admin: 09/27/17 20:10 Dose:  1 mg


Lithium Carbonate (Lithium Carbonate Tab*)  900 mg PO BEDTIME [facility]


   Last Admin: 09/27/17 20:10 Dose:  900 mg


Losartan Potassium (Cozaar Tab*)  25 mg PO DAILY [facility]


   Last Admin: 09/28/17 09:43 Dose:  25 mg


Melatonin (Melatonin (Nf))  3 mg PO BEDTIME [facility]


   Last Admin: 09/27/17 20:15 Dose:  Not Given


Multi-Ingredient Liniment/Rub (Eric De Luna*)  1 applic TOPICAL TID PRN


   PRN Reason: MUSCLE ACHE


   Last Admin: 09/19/17 11:32 Dose:  1 applic


Multivitamins (Theragran Tab*)  1 tab PO DAILY [facility]


   Last Admin: 09/28/17 09:43 Dose:  1 tab


Nicotine Polacrilex (Nicotine Gum*)  2 mg PO Q2H PRN


   PRN Reason: CRAVING


Polyethylene Glycol/Electrolytes (Miralax*)  17 gm PO DAILY [facility]


   Last Admin: 09/27/17 09:21 Dose:  17 gm


Risperidone (Risperdal*)  6 mg PO BEDTIME [facility]


   Last Admin: 09/27/17 20:10 Dose:  6 mg


Rivaroxaban (Xarelto (*))  20 mg PO DAILY [facility]


   Last Admin: 09/28/17 09:43 Dose:  20 mg


Senna (Senokot Tab*)  1 tab PO BID [facility]


   Last Admin: 09/28/17 09:43 Dose:  1 tab











- Discharge Plan


Discharge Plan: Outpatient Follow Up

## 2017-09-29 RX ADMIN — RIVAROXABAN SCH MG: 20 TABLET, FILM COATED ORAL at 08:22

## 2017-09-29 RX ADMIN — LITHIUM CARBONATE SCH MG: 300 TABLET ORAL at 20:03

## 2017-09-29 RX ADMIN — Medication SCH MG: at 20:03

## 2017-09-29 RX ADMIN — Medication SCH TAB: at 08:21

## 2017-09-29 RX ADMIN — CLONAZEPAM SCH MG: 1 TABLET ORAL at 20:04

## 2017-09-29 RX ADMIN — THERA TABS SCH TAB: TAB at 08:22

## 2017-09-29 RX ADMIN — POLYETHYLENE GLYCOL 3350 SCH GM: 17 POWDER, FOR SOLUTION ORAL at 08:25

## 2017-09-29 RX ADMIN — SENNOSIDES SCH TAB: 8.6 TABLET, FILM COATED ORAL at 08:22

## 2017-09-29 RX ADMIN — Medication SCH TAB: at 20:03

## 2017-09-29 RX ADMIN — BENZTROPINE MESYLATE SCH MG: 1 TABLET ORAL at 20:04

## 2017-09-29 RX ADMIN — BENZTROPINE MESYLATE SCH MG: 1 TABLET ORAL at 08:21

## 2017-09-29 RX ADMIN — SENNOSIDES SCH TAB: 8.6 TABLET, FILM COATED ORAL at 20:03

## 2017-09-29 RX ADMIN — RISPERIDONE SCH MG: 3 TABLET ORAL at 20:03

## 2017-09-29 RX ADMIN — ALUMINUM HYDROXIDE, MAGNESIUM HYDROXIDE, AND SIMETHICONE PRN ML: 200; 200; 20 SUSPENSION ORAL at 09:22

## 2017-09-29 RX ADMIN — LOSARTAN POTASSIUM SCH MG: 25 TABLET, FILM COATED ORAL at 08:22

## 2017-09-29 NOTE — PN
Subjective





- Subjective


Service Type: 40697 Hosp care 15 min low complexity


Subjective: 





Patient noted to continue to be med compliant with meds since court ordered 

TOO. Patient again declines to talk to this provider. Patient continues to be 

visible in the milieu, mostly isolating. He is noted to have good appetite and 

sleep. Patient is able to verbalize his needs.  





Objective





- Appearance


Appearance: Well Developed/Nourished, Thin Framed


Dysmorphic Features: No


Hygiene: Normal


Grooming: Fairly Well Kept





- Behavior


Psychomotor Activities: Normal


Exhibits Abnormal Movement: No





- Attitude and Relatedness


Attitude and Relatedness: Psychotically Related


Eye Contact: Poor





- Speech


Quality: Unpressured


Latencies: Normal


Quantity: Terse





- Mood


Patient's Decription of Mood: unable to assess





- Thought Process


Thought Content: No Passive Death Wish - unable to assess, No Suicidal Planning 

- unable to assess, No Homicidal Ideation - unable to assess, No Paranoid 

Ideation - unable to assess





- Sensorium


Experiencing Hallucinations: No, Sensorium is Clear


Type of Hallucinations: Visual: No - unable to assess, Auditory: No - unable to 

assess, Command: No - unable to assess





- Level of Consciousness


Level of Consciousness: Alert


Orientation: No Intact - unable to assess, No Orientated to Time - unable to 

assess, No Orientated to Place - unable to assess, No Orientated to Person - 

unable to assess





- Impulse Control


Impulse Control: Impaired





- Insight and Judgement


Insight and Judgement: Impaired





- Group Participation


Particating in Group Activities: No





- Medication Management


Medication Management Adherence: Yes





Assessment





- Assessment


Merits Inpatient Hospitalization: For Immediate Safety, For Stabilization


Inpatient DSM-IV Dx: Schizophrenia





Plan





- Plan


Treatment Plan: 


Name: JYOTI STEEN                        


YOB: 1955                        


[id number]


[id number]








1. Continue admission to [facility] for safety and symptom mx.


2. Navane D/C'd due to patient's reported disdain for med and hx of med non-

compliance.


3. Hearing for treatment over objection completed 9/22/17.


4. Continue TOO Risperdal at 6mg po qhs for psychosis with plan to initiate 

Invega Sustenna prior to discharge.


5. Continue TOO Lithium 900mg po qhs for mood stabilization/augmentation. 


6. Continue TOO Xarelto 20mg po daily for anticoagulation therapy dx-Afib.  


7. Obtain information on patient's baseline from ACT, Home care giver, or from 

past [facility] provider.


8. Collateral information obtained from PCP-Dr. Rachid Luevano#[phone] and 

ACT team. 


9. Patient to participate in milieu activities and groups.


Medications: 


 Current Medications





Acetaminophen (Tylenol Tab*)  650 mg PO Q4H PRN


   PRN Reason: PAIN or TEMP > 101 F


Al Hydrox/Mg Hydrox/Simethicone (Maalox Plus*)  30 ml PO Q4H PRN


   PRN Reason: INDIGESTION


   Last Admin: 09/29/17 09:22 Dose:  30 ml


Benztropine Mesylate (Cogentin Tab*)  1 mg PO BID [facility]


   Last Admin: 09/29/17 08:21 Dose:  1 mg


Calcium/Vitamin D (Oscal D Tab 250/125*)  2 tab PO BID [facility]


   Last Admin: 09/29/17 08:21 Dose:  2 tab


Clonazepam (Klonopin Tab(*))  1 mg PO BEDTIME [facility]


   Last Admin: 09/28/17 21:04 Dose:  1 mg


Lithium Carbonate (Lithium Carbonate Tab*)  900 mg PO BEDTIME [facility]


   Last Admin: 09/28/17 21:04 Dose:  900 mg


Losartan Potassium (Cozaar Tab*)  25 mg PO DAILY [facility]


   Last Admin: 09/29/17 08:22 Dose:  25 mg


Melatonin (Melatonin (Nf))  3 mg PO BEDTIME [facility]


   Last Admin: 09/28/17 21:05 Dose:  Not Given


Multi-Ingredient Liniment/Rub (Eric De Luna*)  1 applic TOPICAL TID PRN


   PRN Reason: MUSCLE ACHE


   Last Admin: 09/28/17 11:35 Dose:  1 applic


Multivitamins (Theragran Tab*)  1 tab PO DAILY [facility]


   Last Admin: 09/29/17 08:22 Dose:  1 tab


Nicotine Polacrilex (Nicotine Gum*)  2 mg PO Q2H PRN


   PRN Reason: CRAVING


Polyethylene Glycol/Electrolytes (Miralax*)  17 gm PO DAILY [facility]


   Last Admin: 09/29/17 08:25 Dose:  17 gm


Risperidone (Risperdal*)  6 mg PO BEDTIME [facility]


   Last Admin: 09/28/17 21:03 Dose:  6 mg


Rivaroxaban (Xarelto (*))  20 mg PO DAILY [facility]


   Last Admin: 09/29/17 08:22 Dose:  20 mg


Senna (Senokot Tab*)  1 tab PO BID [facility]


   Last Admin: 09/29/17 08:22 Dose:  1 tab











- Discharge Plan


Discharge Plan: Outpatient Follow Up


Outpatient Program: [facility] team

## 2017-09-30 RX ADMIN — SENNOSIDES SCH TAB: 8.6 TABLET, FILM COATED ORAL at 08:50

## 2017-09-30 RX ADMIN — SENNOSIDES SCH TAB: 8.6 TABLET, FILM COATED ORAL at 19:13

## 2017-09-30 RX ADMIN — LOSARTAN POTASSIUM SCH MG: 25 TABLET, FILM COATED ORAL at 08:49

## 2017-09-30 RX ADMIN — RISPERIDONE SCH MG: 3 TABLET ORAL at 19:11

## 2017-09-30 RX ADMIN — CLONAZEPAM SCH MG: 1 TABLET ORAL at 19:13

## 2017-09-30 RX ADMIN — Medication SCH TAB: at 08:49

## 2017-09-30 RX ADMIN — POLYETHYLENE GLYCOL 3350 SCH GM: 17 POWDER, FOR SOLUTION ORAL at 08:49

## 2017-09-30 RX ADMIN — LITHIUM CARBONATE SCH MG: 300 TABLET ORAL at 19:12

## 2017-09-30 RX ADMIN — BENZTROPINE MESYLATE SCH MG: 1 TABLET ORAL at 19:13

## 2017-09-30 RX ADMIN — MENTHOL, METHYL SALICYLATE PRN APPLIC: 10; 15 CREAM TOPICAL at 11:03

## 2017-09-30 RX ADMIN — BENZTROPINE MESYLATE SCH MG: 1 TABLET ORAL at 08:49

## 2017-09-30 RX ADMIN — THERA TABS SCH TAB: TAB at 08:50

## 2017-09-30 RX ADMIN — RIVAROXABAN SCH MG: 20 TABLET, FILM COATED ORAL at 08:50

## 2017-09-30 RX ADMIN — Medication SCH TAB: at 19:12

## 2017-09-30 RX ADMIN — Medication SCH MG: at 19:12

## 2017-10-01 RX ADMIN — LOSARTAN POTASSIUM SCH MG: 25 TABLET, FILM COATED ORAL at 08:51

## 2017-10-01 RX ADMIN — Medication SCH MG: at 19:41

## 2017-10-01 RX ADMIN — RIVAROXABAN SCH MG: 20 TABLET, FILM COATED ORAL at 08:53

## 2017-10-01 RX ADMIN — RISPERIDONE SCH MG: 3 TABLET ORAL at 19:41

## 2017-10-01 RX ADMIN — Medication SCH TAB: at 08:51

## 2017-10-01 RX ADMIN — BENZTROPINE MESYLATE SCH MG: 1 TABLET ORAL at 19:42

## 2017-10-01 RX ADMIN — SENNOSIDES SCH TAB: 8.6 TABLET, FILM COATED ORAL at 19:41

## 2017-10-01 RX ADMIN — SENNOSIDES SCH TAB: 8.6 TABLET, FILM COATED ORAL at 08:51

## 2017-10-01 RX ADMIN — THERA TABS SCH TAB: TAB at 08:53

## 2017-10-01 RX ADMIN — BENZTROPINE MESYLATE SCH MG: 1 TABLET ORAL at 08:51

## 2017-10-01 RX ADMIN — LITHIUM CARBONATE SCH MG: 300 TABLET ORAL at 19:41

## 2017-10-01 RX ADMIN — Medication SCH TAB: at 19:42

## 2017-10-01 RX ADMIN — CLONAZEPAM SCH MG: 1 TABLET ORAL at 19:41

## 2017-10-01 RX ADMIN — POLYETHYLENE GLYCOL 3350 SCH GM: 17 POWDER, FOR SOLUTION ORAL at 08:53

## 2017-10-02 RX ADMIN — SENNOSIDES SCH TAB: 8.6 TABLET, FILM COATED ORAL at 08:54

## 2017-10-02 RX ADMIN — POLYETHYLENE GLYCOL 3350 SCH GM: 17 POWDER, FOR SOLUTION ORAL at 08:54

## 2017-10-02 RX ADMIN — CLONAZEPAM SCH MG: 1 TABLET ORAL at 20:08

## 2017-10-02 RX ADMIN — BENZTROPINE MESYLATE SCH MG: 1 TABLET ORAL at 08:54

## 2017-10-02 RX ADMIN — LITHIUM CARBONATE SCH MG: 300 TABLET ORAL at 20:09

## 2017-10-02 RX ADMIN — Medication SCH TAB: at 08:54

## 2017-10-02 RX ADMIN — Medication SCH TAB: at 20:07

## 2017-10-02 RX ADMIN — THERA TABS SCH TAB: TAB at 08:54

## 2017-10-02 RX ADMIN — RISPERIDONE SCH MG: 3 TABLET ORAL at 20:07

## 2017-10-02 RX ADMIN — BENZTROPINE MESYLATE SCH MG: 1 TABLET ORAL at 20:09

## 2017-10-02 RX ADMIN — MENTHOL, METHYL SALICYLATE PRN APPLIC: 10; 15 CREAM TOPICAL at 09:27

## 2017-10-02 RX ADMIN — RIVAROXABAN SCH MG: 20 TABLET, FILM COATED ORAL at 08:54

## 2017-10-02 RX ADMIN — LOSARTAN POTASSIUM SCH MG: 25 TABLET, FILM COATED ORAL at 08:54

## 2017-10-02 NOTE — PN
Subjective





- Subjective


Service Type: 54455 Hosp care 15 min low complexity


Subjective: 





Patient seek out this provider to interview today. Patient is noted to be 

engaged in the interview, linear in TP and appropriate in TC.


Patient reports concern he is experiencing increased episodes of urinary 

incontinence. He reports episodes of losing urine. He also 


reports when trying to urinate he notices increased time to start his stream 

and reports he does not empty his bladder. He reports also


increased BMs. He requests Senokot be changed from scheduled to PRN. Patient 

brings up the topic of tomorrow's scheduled 


administrative hearing for possible transfer to [facility]. He reports feeling he 

does not need [facility] hospitalization. He was informed the 


meeting does not mean he is definitely being transferred. Patient has been med 

compliant. He is amenable to drop dose of Cogentin


from 1mg po BID to 0.5mg po BID for eps as Cogentin is known to cause urinary 

incontinence. Patient currently has AIMS test 


score - 0. Patient denies abd pain. He denies HA. He reports new mild CP. He is 

amenable to EKG in am. Patient denies SI/HI and AH/VH. 


He reports fair sleep and appetite.





Objective





- Appearance


Appearance: Well Developed/Nourished, Thin Framed


Dysmorphic Features: No


Hygiene: Normal


Grooming: Well Kept





- Behavior


Psychomotor Activities: Normal


Exhibits Abnormal Movement: No





- Attitude and Relatedness


Attitude and Relatedness: Cooperative


Eye Contact: Fair





- Speech


Quality: Unpressured


Latencies: Normal


Quantity: Appropriate





- Mood


Patient's Decription of Mood: "Fine"





- Affect


Observed Affect: Fair


Affect Consistent with: Euthymia





- Thought Process


Patient's Thought Process: Coherent


Thought Content: No Passive Death Wish, No Suicidal Planning, No Homicidal 

Ideation, No Paranoid Ideation





- Sensorium


Experiencing Hallucinations: No, Sensorium is Clear


Type of Hallucinations: Visual: No, Auditory: No, Command: No





- Level of Consciousness


Level of Consciousness: Alert


Orientation: Yes Intact, Yes Orientated to Time, Yes Orientated to Place, Yes 

Orientated to Person





- Impulse Control


Impulse Control: Intact





- Insight and Judgement


Insight and Judgement: Fair





- Group Participation


Particating in Group Activities: Yes





- Medication Management


Medication Management Adherence: Yes





Assessment





- Assessment


Merits Inpatient Hospitalization: For Immediate Safety, For Stabilization


Inpatient DSM-IV Dx: Schizophrenia





Plan





- Plan


Treatment Plan: 


Name: JYOTI STEEN                        


YOB: 1955                        


[id number]


[id number]








1. Continue admission to [facility] for safety and symptom mx.


2. Navane D/C'd due to patient's reported disdain for med and hx of med non-

compliance.


3. Hearing for treatment over objection completed 9/22/17. Patient has been med 

compliant since.


4. Continue TOO Risperdal at 6mg po qhs for psychosis. 


5. EKG in am 10/3/17 due to patient's report of CP.


6. Patient declines recommendation to initiate SOTELO therapy.


7. Continue TOO Lithium 900mg po qhs for mood stabilization/augmentation. 


8. Continue TOO Xarelto 20mg po daily for anticoagulation therapy dx-Afib. 


9. Administrative hearing tomorrow at 1pm.


10. Patient reports increased stools and requests that scheduled 1 Senokot po 

BID be made PRN.


    Continue Senokot at 1 po BID PRN constipation.


11. Will decrease Cogentin from 1mg po BID to 0.5mg po BID due to patient's 

reports of urinary incontinence. Patient


     currently with AIMS score - 0.


12. Obtain information on patient's baseline from ACT, Home care giver, or from 

past [facility] provider.


13. Collateral information obtained from PCP-Dr. Rachid Luevano#[phone] and 

ACT team. 


14. Patient to participate in milieu activities and groups.


Medications: 


 Current Medications





Acetaminophen (Tylenol Tab*)  650 mg PO Q4H PRN


   PRN Reason: PAIN or TEMP > 101 F


Al Hydrox/Mg Hydrox/Simethicone (Maalox Plus*)  30 ml PO Q4H PRN


   PRN Reason: INDIGESTION


   Last Admin: 09/29/17 09:22 Dose:  30 ml


Benztropine Mesylate (Cogentin Tab*)  1 mg PO BID [facility]


   Last Admin: 10/02/17 08:54 Dose:  1 mg


Calcium/Vitamin D (Oscal D Tab 250/125*)  2 tab PO BID [facility]


   Last Admin: 10/02/17 08:54 Dose:  2 tab


Clonazepam (Klonopin Tab(*))  1 mg PO BEDTIME [facility]


   Last Admin: 10/01/17 19:41 Dose:  1 mg


Lithium Carbonate (Lithium Carbonate Tab*)  900 mg PO BEDTIME [facility]


   Last Admin: 10/01/17 19:41 Dose:  900 mg


Losartan Potassium (Cozaar Tab*)  25 mg PO DAILY [facility]


   Last Admin: 10/02/17 08:54 Dose:  25 mg


Melatonin (Melatonin (Nf))  3 mg PO BEDTIME [facility]


   Last Admin: 10/01/17 19:41 Dose:  3 mg


Multi-Ingredient Liniment/Rub (Eric De Luna*)  1 applic TOPICAL TID PRN


   PRN Reason: MUSCLE ACHE


   Last Admin: 10/02/17 09:27 Dose:  1 applic


Multivitamins (Theragran Tab*)  1 tab PO DAILY [facility]


   Last Admin: 10/02/17 08:54 Dose:  1 tab


Nicotine Polacrilex (Nicotine Gum*)  2 mg PO Q2H PRN


   PRN Reason: CRAVING


Polyethylene Glycol/Electrolytes (Miralax*)  17 gm PO DAILY [facility]


   Last Admin: 10/02/17 08:54 Dose:  17 gm


Risperidone (Risperdal*)  6 mg PO BEDTIME [facility]


   Last Admin: 10/01/17 19:41 Dose:  6 mg


Rivaroxaban (Xarelto (*))  20 mg PO DAILY [facility]


   Last Admin: 10/02/17 08:54 Dose:  20 mg


Senna (Senokot Tab*)  1 tab PO BID [facility]


   Last Admin: 10/02/17 08:54 Dose:  1 tab











- Discharge Plan


Discharge Plan: Outpatient Follow Up


Additional Comments: 





ACT team

## 2017-10-03 RX ADMIN — THERA TABS SCH TAB: TAB at 08:03

## 2017-10-03 RX ADMIN — BENZTROPINE MESYLATE SCH MG: 1 TABLET ORAL at 20:14

## 2017-10-03 RX ADMIN — LITHIUM CARBONATE SCH MG: 300 TABLET ORAL at 20:15

## 2017-10-03 RX ADMIN — POLYETHYLENE GLYCOL 3350 SCH GM: 17 POWDER, FOR SOLUTION ORAL at 08:57

## 2017-10-03 RX ADMIN — POLYETHYLENE GLYCOL 3350 SCH: 17 POWDER, FOR SOLUTION ORAL at 08:05

## 2017-10-03 RX ADMIN — Medication SCH MG: at 20:15

## 2017-10-03 RX ADMIN — RISPERIDONE SCH MG: 3 TABLET ORAL at 20:15

## 2017-10-03 RX ADMIN — BENZTROPINE MESYLATE SCH MG: 1 TABLET ORAL at 08:04

## 2017-10-03 RX ADMIN — Medication SCH TAB: at 08:03

## 2017-10-03 RX ADMIN — Medication SCH: at 19:10

## 2017-10-03 RX ADMIN — LOSARTAN POTASSIUM SCH MG: 25 TABLET, FILM COATED ORAL at 08:03

## 2017-10-03 RX ADMIN — RIVAROXABAN SCH MG: 20 TABLET, FILM COATED ORAL at 08:05

## 2017-10-03 RX ADMIN — CLONAZEPAM SCH MG: 1 TABLET ORAL at 20:16

## 2017-10-03 RX ADMIN — MENTHOL, METHYL SALICYLATE PRN APPLIC: 10; 15 CREAM TOPICAL at 09:25

## 2017-10-03 RX ADMIN — Medication SCH TAB: at 20:16

## 2017-10-03 NOTE — PN
Subjective





- Subjective


Service Type: 47429 Hosp care 15 min low complexity


Subjective: 





Patient seen during Administrative hearing today at 1pm. Patient noted to be 

calm, cooperative, and engaged in the 


process. Patient displayed linear and GD TP. He displayed good memory and was 

appropriate in TC. Patient noted to


have full affect and smiled during and at the end of the hearing. Patient has 

been med compliant since court ordered


TOO. Patient noted to have fair sleep and appetite. Patient aware with further 

signs of participating in therapy, ie:


him attending groups, he is likely to d/c home. Patient agreed to sign LEONCIO to 

ACT. He has agreed to meet with ACT 


staff to evaluate if patient near his baseline.





Objective





- Appearance


Appearance: Well Developed/Nourished, Thin Framed


Dysmorphic Features: No


Hygiene: Normal


Grooming: Fairly Well Kept





- Behavior


Psychomotor Activities: Normal


Exhibits Abnormal Movement: No





- Attitude and Relatedness


Attitude and Relatedness: Cooperative


Eye Contact: Fair





- Speech


Quality: Unpressured


Latencies: Normal


Quantity: Appropriate





- Mood


Patient's Decription of Mood: "Okay"





- Affect


Observed Affect: Fair


Affect Consistent with: Euthymia





- Thought Process


Patient's Thought Process: Coherent


Thought Content: No Passive Death Wish, No Suicidal Planning, No Homicidal 

Ideation, No Paranoid Ideation





- Sensorium


Experiencing Hallucinations: No, Sensorium is Clear


Type of Hallucinations: Visual: No, Auditory: No, Command: No





- Level of Consciousness


Level of Consciousness: Alert


Orientation: Yes Intact, Yes Orientated to Time, Yes Orientated to Place, Yes 

Orientated to Person





- Impulse Control


Impulse Control: Intact





- Insight and Judgement


Insight and Judgement: Fair





- Group Participation


Particating in Group Activities: Yes





- Medication Management


Medication Management Adherence: Yes





Assessment





- Assessment


Merits Inpatient Hospitalization: For Immediate Safety, For Stabilization


Inpatient DSM-IV Dx: Schizophrenia





Plan





- Plan


Treatment Plan: 


Name: JYOTI STEEN                        


YOB: 1955                        


[id number]


[id number]








1. Continue admission to [facility] for safety and symptom mx.


2. Navane D/C'd due to patient's reported disdain for med and hx of med non-

compliance.


3. Hearing for treatment over objection completed 9/22/17. Patient has been med 

compliant since.


4. Continue TOO Risperdal at 6mg po qhs for psychosis. 


5. EKG - 10/3/17 shows Afib, QTc - wnl


6. Patient declines recommendation to initiate SOTELO therapy.


7. Continue TOO Lithium 900mg po qhs for mood stabilization/augmentation. 


8. Continue TOO Xarelto 20mg po daily for anticoagulation therapy dx-Afib. 


9. Administrative hearing completed 10/3/17. Patient agrees to sign LEONCIO for ACT 

team, meet with ACT team, and attend groups.


10. Patient reports increased stools and requests that scheduled Miralax and 

scheduled 1 Senokot po BID both be made PRN.


     Continue Senokot at 1 po BID PRN constipation. Continue Miralax 17gm daily 

PRN constipation.


11. Continue Cogentin at 0.5mg po BID for eps, due to patient's reports of 

urinary incontinence. Patient


     currently with AIMS score - 0.


12. Obtain information on patient's baseline from ACT, Home care giver, or from 

past [facility] provider.


13. Collateral information obtained from PCP-Dr. Rachid Luevano#[phone] and 

ACT team. 


14. Patient to participate in milieu activities and groups.


Medications: 


 Current Medications





Acetaminophen (Tylenol Tab*)  650 mg PO Q4H PRN


   PRN Reason: PAIN or TEMP > 101 F


Al Hydrox/Mg Hydrox/Simethicone (Maalox Plus*)  30 ml PO Q4H PRN


   PRN Reason: INDIGESTION


   Last Admin: 09/29/17 09:22 Dose:  30 ml


Benztropine Mesylate (Cogentin Tab*)  0.5 mg PO BID [facility]


   Last Admin: 10/03/17 08:04 Dose:  0.5 mg


Calcium/Vitamin D (Oscal D Tab 250/125*)  2 tab PO BID [facility]


   Last Admin: 10/03/17 08:03 Dose:  2 tab


Clonazepam (Klonopin Tab(*))  1 mg PO BEDTIME [facility]


   Last Admin: 10/02/17 20:08 Dose:  1 mg


Lithium Carbonate (Lithium Carbonate Tab*)  900 mg PO BEDTIME [facility]


   Last Admin: 10/02/17 20:09 Dose:  900 mg


Losartan Potassium (Cozaar Tab*)  25 mg PO DAILY [facility]


   Last Admin: 10/03/17 08:03 Dose:  25 mg


Melatonin (Melatonin (Nf))  3 mg PO BEDTIME [facility]


   Last Admin: 10/01/17 19:41 Dose:  3 mg


Multi-Ingredient Liniment/Rub (Eric De Luna*)  1 applic TOPICAL TID PRN


   PRN Reason: MUSCLE ACHE


   Last Admin: 10/03/17 09:25 Dose:  1 applic


Multivitamins (Theragran Tab*)  1 tab PO DAILY [facility]


   Last Admin: 10/03/17 08:03 Dose:  1 tab


Nicotine Polacrilex (Nicotine Gum*)  2 mg PO Q2H PRN


   PRN Reason: CRAVING


Polyethylene Glycol/Electrolytes (Miralax*)  17 gm PO DAILY [facility]


   Last Admin: 10/03/17 08:57 Dose:  17 gm


Risperidone (Risperdal*)  6 mg PO BEDTIME [facility]


   Last Admin: 10/02/17 20:07 Dose:  6 mg


Rivaroxaban (Xarelto (*))  20 mg PO DAILY [facility]


   Last Admin: 10/03/17 08:05 Dose:  20 mg


Senna (Senokot Tab*)  1 tab PO BID PRN


   PRN Reason: CONSTIPATION











- Discharge Plan


Discharge Plan: Outpatient Follow Up


Additional Comments: 





ACT team

## 2017-10-04 RX ADMIN — RIVAROXABAN SCH MG: 20 TABLET, FILM COATED ORAL at 08:10

## 2017-10-04 RX ADMIN — Medication SCH: at 20:18

## 2017-10-04 RX ADMIN — LITHIUM CARBONATE SCH MG: 300 TABLET ORAL at 20:15

## 2017-10-04 RX ADMIN — LOSARTAN POTASSIUM SCH MG: 25 TABLET, FILM COATED ORAL at 08:10

## 2017-10-04 RX ADMIN — Medication SCH TAB: at 08:10

## 2017-10-04 RX ADMIN — THERA TABS SCH TAB: TAB at 08:10

## 2017-10-04 RX ADMIN — CLONAZEPAM SCH MG: 1 TABLET ORAL at 20:16

## 2017-10-04 RX ADMIN — RISPERIDONE SCH MG: 3 TABLET ORAL at 20:15

## 2017-10-04 RX ADMIN — BENZTROPINE MESYLATE SCH MG: 1 TABLET ORAL at 20:17

## 2017-10-04 RX ADMIN — BENZTROPINE MESYLATE SCH MG: 1 TABLET ORAL at 08:10

## 2017-10-04 NOTE — PN
MHU: Group Therapy Note





- Service Type


Service Type: 31956 Group Psychotherapy - Cognitive Behavioral Group Therapy (

CBT):Patient was attentive and participatory in CBT programming this morning, 

and remained in good behavioral control.  Patient expressed positive insights 

regarding relevant treatment interventions and goals.

## 2017-10-04 NOTE — PN
Subjective





- Subjective


Service Type: 90876 Hosp care 15 min low complexity


Subjective: 





Patient noted to be visible in the milieu, pleasant, social with select peers. 

On interview, patient is again calm, cooperative, 


and engages the conversation. Patient has good eye contact and is full to 

bright at times in affect. Patient displayed linear and GD TP. 


He expresses no delusional thought. Patient has been med compliant since court 

ordered TOO. Patient noted to have fair sleep and 


appetite. Patient has signed LEONCIO and is amenable to meeting with ACT team staff 

so they can determine if patient back to baseline 


and safe for discharge. Patient reports no med s/e's. He reports no SI/HI or AH/

VH. He is looking forward to his first staff pass.





Objective





- Appearance


Appearance: Well Developed/Nourished, Thin Framed


Dysmorphic Features: No


Hygiene: Normal


Grooming: Well Kept





- Behavior


Psychomotor Activities: Normal


Exhibits Abnormal Movement: No





- Attitude and Relatedness


Attitude and Relatedness: Cooperative


Eye Contact: Fair





- Speech


Quality: Unpressured


Latencies: Normal


Quantity: Appropriate





- Mood


Patient's Decription of Mood: "Good"





- Affect


Observed Affect: Good


Affect Consistent with: Euthymia





- Thought Process


Patient's Thought Process: Coherent


Thought Content: No Passive Death Wish, No Suicidal Planning, No Homicidal 

Ideation, No Paranoid Ideation





- Sensorium


Experiencing Hallucinations: No, Sensorium is Clear


Type of Hallucinations: Visual: No, Auditory: No, Command: No





- Level of Consciousness


Level of Consciousness: Alert


Orientation: Yes Intact, Yes Orientated to Time, Yes Orientated to Place, Yes 

Orientated to Person





- Impulse Control


Impulse Control: Intact





- Insight and Judgement


Insight and Judgement: Fair





- Group Participation


Particating in Group Activities: Yes





- Medication Management


Medication Management Adherence: Yes





Assessment





- Assessment


Merits Inpatient Hospitalization: For Immediate Safety, For Stabilization


Inpatient DSM-IV Dx: Schizophrenia





Plan





- Plan


Treatment Plan: 


Name: JYOTI STEEN                        


YOB: 1955                        


[id number]


[id number]








1. Continue admission to [facility] for safety and symptom mx.


2. Navane D/C'd due to patient's reported disdain for med and hx of med non-

compliance.


3. Hearing for treatment over objection completed 9/22/17. Patient has been med 

compliant since.


4. Continue TOO Risperdal at 6mg po qhs for psychosis. 


5. EKG - 10/3/17 shows Afib, QTc - wnl


6. Patient declines recommendation to initiate SOETLO therapy.


7. Continue TOO Lithium 900mg po qhs for mood stabilization/augmentation. 


8. Continue TOO Xarelto 20mg po daily for anticoagulation therapy dx-Afib. 


9. Administrative hearing completed 10/3/17. Patient agrees to sign LEONCIO for ACT 

team, meet with ACT team, and attend groups.


10. Patient reports increased stools and requests that scheduled Miralax and 

scheduled 1 Senokot po BID both be made PRN.


     Continue Senokot at 1 po BID PRN constipation. Continue Miralax 17gm daily 

PRN constipation.


11. Continue Cogentin at 0.5mg po BID for eps, due to patient's reports of 

urinary incontinence. Patient


     currently with AIMS score - 0.


12. Obtain information on patient's baseline from ACT, Home care giver, or from 

past [facility] provider.


13. Collateral information obtained from PCP-Dr. Rachid Luevano#[phone] and 

ACT team. 


14. Patient to participate in milieu activities and groups.


Medications: 


 Current Medications





Acetaminophen (Tylenol Tab*)  650 mg PO Q4H PRN


   PRN Reason: PAIN or TEMP > 101 F


Al Hydrox/Mg Hydrox/Simethicone (Maalox Plus*)  30 ml PO Q4H PRN


   PRN Reason: INDIGESTION


   Last Admin: 09/29/17 09:22 Dose:  30 ml


Benztropine Mesylate (Cogentin Tab*)  0.5 mg PO BID [facility]


   Last Admin: 10/04/17 08:10 Dose:  0.5 mg


Calcium/Vitamin D (Oscal D Tab 250/125*)  2 tab PO BID [facility]


   Last Admin: 10/04/17 08:10 Dose:  2 tab


Clonazepam (Klonopin Tab(*))  1 mg PO BEDTIME [facility]


   Last Admin: 10/03/17 20:16 Dose:  1 mg


Lithium Carbonate (Lithium Carbonate Tab*)  900 mg PO BEDTIME [facility]


   Last Admin: 10/03/17 20:15 Dose:  900 mg


Losartan Potassium (Cozaar Tab*)  25 mg PO DAILY [facility]


   Last Admin: 10/04/17 08:10 Dose:  25 mg


Melatonin (Melatonin (Nf))  3 mg PO BEDTIME [facility]


   Last Admin: 10/03/17 20:15 Dose:  3 mg


Multi-Ingredient Liniment/Rub (Eric De Luna*)  1 applic TOPICAL TID PRN


   PRN Reason: MUSCLE ACHE


   Last Admin: 10/03/17 09:25 Dose:  1 applic


Multivitamins (Theragran Tab*)  1 tab PO DAILY [facility]


   Last Admin: 10/04/17 08:10 Dose:  1 tab


Nicotine Polacrilex (Nicotine Gum*)  2 mg PO Q2H PRN


   PRN Reason: CRAVING


Polyethylene Glycol/Electrolytes (Miralax*)  17 gm PO DAILY PRN


   PRN Reason: CONSTIPATION


   Last Admin: 10/04/17 08:10 Dose:  17 gm


Risperidone (Risperdal*)  6 mg PO BEDTIME [facility]


   Last Admin: 10/03/17 20:15 Dose:  6 mg


Rivaroxaban (Xarelto (*))  20 mg PO DAILY [facility]


   Last Admin: 10/04/17 08:10 Dose:  20 mg


Senna (Senokot Tab*)  1 tab PO BID PRN


   PRN Reason: CONSTIPATION











- Discharge Plan


Additional Comments: 





ACT team

## 2017-10-05 RX ADMIN — BENZTROPINE MESYLATE SCH MG: 1 TABLET ORAL at 20:01

## 2017-10-05 RX ADMIN — Medication SCH: at 20:01

## 2017-10-05 RX ADMIN — THERA TABS SCH TAB: TAB at 08:18

## 2017-10-05 RX ADMIN — BENZTROPINE MESYLATE SCH MG: 1 TABLET ORAL at 08:19

## 2017-10-05 RX ADMIN — RIVAROXABAN SCH MG: 20 TABLET, FILM COATED ORAL at 08:18

## 2017-10-05 RX ADMIN — CLONAZEPAM SCH MG: 1 TABLET ORAL at 19:58

## 2017-10-05 RX ADMIN — Medication SCH TAB: at 08:18

## 2017-10-05 RX ADMIN — LOSARTAN POTASSIUM SCH MG: 25 TABLET, FILM COATED ORAL at 08:18

## 2017-10-05 RX ADMIN — LITHIUM CARBONATE SCH MG: 300 TABLET ORAL at 19:58

## 2017-10-05 RX ADMIN — RISPERIDONE SCH MG: 3 TABLET ORAL at 19:59

## 2017-10-05 NOTE — PN
Subjective





- Subjective


Service Type: 82523 Hosp care 15 min low complexity


Subjective: 





Patient noted to be visible in the milieu, pleasant, social with select peers. 

Again patient is amenable to interview. He is calm, cooperative, 


and engages the conversation. Patient has good eye contact and is full to 

bright at times in affect. Patient displayed linear and GD TP. 


He expresses no delusional thought. Patient informed ACT team would not be able 

to see him today or tomorrow to give team a sense of 


if he was back to baseline. Patient informed they request a Monday discharge. 

Patient requests Friday discharge as planned. He appropriately


next answered question of how he would eat / get his meds filled. He reports 

having his keys, money on his debit card, and he offered a plan 


w/o me asking, of walking to a nearby [employer] / [employer] for food / Rxs. 

Patient has been med compliant since court ordered TOO. Patient 


noted to have fair sleep and appetite. Patient reports no med s/e's. He reports 

no SI/HI or AH/VH. 





Objective





- Appearance


Appearance: Well Developed/Nourished, Thin Framed


Dysmorphic Features: No


Hygiene: Normal


Grooming: Well Kept





- Behavior


Psychomotor Activities: Normal


Exhibits Abnormal Movement: No





- Attitude and Relatedness


Attitude and Relatedness: Cooperative


Eye Contact: Good





- Speech


Quality: Unpressured


Latencies: Normal


Quantity: Appropriate





- Mood


Patient's Decription of Mood: "Good"





- Affect


Observed Affect: Good


Affect Consistent with: Euthymia





- Thought Process


Patient's Thought Process: Coherent


Thought Content: No Passive Death Wish, No Suicidal Planning, No Homicidal 

Ideation, No Paranoid Ideation





- Sensorium


Experiencing Hallucinations: No, Sensorium is Clear


Type of Hallucinations: Visual: No, Auditory: No, Command: No





- Level of Consciousness


Level of Consciousness: Alert


Orientation: Yes Intact, Yes Orientated to Time, Yes Orientated to Place, Yes 

Orientated to Person





- Impulse Control


Impulse Control: Intact





- Insight and Judgement


Insight and Judgement: Fair





- Group Participation


Particating in Group Activities: Yes





- Medication Management


Medication Management Adherence: Yes





Assessment





- Assessment


Merits Inpatient Hospitalization: For Immediate Safety, For Stabilization


Inpatient DSM-IV Dx: Schizophrenia





Plan





- Plan


Treatment Plan: 


Name: JYOTI STEEN                        


YOB: 1955                        


[id number]


[id number]








1. Continue admission to [facility] for safety and symptom mx.


2. Navane D/C'd due to patient's reported disdain for med and hx of med non-

compliance.


3. Hearing for treatment over objection completed 9/22/17. Patient has been med 

compliant since.


4. Continue TOO Risperdal at 6mg po qhs for psychosis. 


5. EKG - 10/3/17 shows Afib, QTc - wnl


6. Patient declines recommendation to initiate SOTELO therapy.


7. Continue TOO Lithium 900mg po qhs for mood stabilization/augmentation. 


8. Continue TOO Xarelto 20mg po daily for anticoagulation therapy dx-Afib. 


9. Administrative hearing completed 10/3/17. Patient agreed to sign LEONCIO for ACT 

team, meet with ACT team, and attend groups.


10. Patient reports increased stools and requests that scheduled Miralax and 

scheduled 1 Senokot po BID both be made PRN.


     Continue Senokot at 1 po BID PRN constipation. Continue Miralax 17gm daily 

PRN constipation.


11. Continue Cogentin at 0.5mg po BID for eps, due to patient's reports of 

urinary incontinence. Patient


     currently with AIMS score - 0.


12. Discharge tentatively planned for 10/6/17.


13. Obtain information on patient's baseline from ACT, Home care giver, or from 

past [facility] provider.


14. Collateral information obtained from PCP-Dr. Rahcid Luevano#[phone] and 

ACT team. 


15. Patient to participate in milieu activities and groups.


Medications: 


 Current Medications





Acetaminophen (Tylenol Tab*)  650 mg PO Q4H PRN


   PRN Reason: PAIN or TEMP > 101 F


Al Hydrox/Mg Hydrox/Simethicone (Maalox Plus*)  30 ml PO Q4H PRN


   PRN Reason: INDIGESTION


   Last Admin: 09/29/17 09:22 Dose:  30 ml


Benztropine Mesylate (Cogentin Tab*)  0.5 mg PO BID [facility]


   Last Admin: 10/05/17 08:19 Dose:  0.5 mg


Calcium/Vitamin D (Oscal D Tab 250/125*)  2 tab PO BID [facility]


   Last Admin: 10/05/17 08:18 Dose:  2 tab


Clonazepam (Klonopin Tab(*))  1 mg PO BEDTIME [facility]


   Last Admin: 10/04/17 20:16 Dose:  1 mg


Lithium Carbonate (Lithium Carbonate Tab*)  900 mg PO BEDTIME [facility]


   Last Admin: 10/04/17 20:15 Dose:  900 mg


Losartan Potassium (Cozaar Tab*)  25 mg PO DAILY [facility]


   Last Admin: 10/05/17 08:18 Dose:  25 mg


Melatonin (Melatonin (Nf))  3 mg PO BEDTIME [facility]


   Last Admin: 10/04/17 20:18 Dose:  Not Given


Multi-Ingredient Liniment/Rub (Eric De Luna*)  1 applic TOPICAL TID PRN


   PRN Reason: MUSCLE ACHE


   Last Admin: 10/03/17 09:25 Dose:  1 applic


Multivitamins (Theragran Tab*)  1 tab PO DAILY [facility]


   Last Admin: 10/05/17 08:18 Dose:  1 tab


Nicotine Polacrilex (Nicotine Gum*)  2 mg PO Q2H PRN


   PRN Reason: CRAVING


Polyethylene Glycol/Electrolytes (Miralax*)  17 gm PO DAILY PRN


   PRN Reason: CONSTIPATION


   Last Admin: 10/04/17 08:10 Dose:  17 gm


Risperidone (Risperdal*)  6 mg PO BEDTIME [facility]


   Last Admin: 10/04/17 20:15 Dose:  6 mg


Rivaroxaban (Xarelto (*))  20 mg PO DAILY [facility]


   Last Admin: 10/05/17 08:18 Dose:  20 mg


Senna (Senokot Tab*)  1 tab PO BID PRN


   PRN Reason: CONSTIPATION


   Last Admin: 10/05/17 08:20 Dose:  1 tab











- Discharge Plan


Discharge Plan: Outpatient Follow Up


Additional Comments: 





ACT team

## 2017-10-06 VITALS — SYSTOLIC BLOOD PRESSURE: 142 MMHG | DIASTOLIC BLOOD PRESSURE: 86 MMHG

## 2017-10-06 RX ADMIN — THERA TABS SCH TAB: TAB at 07:55

## 2017-10-06 RX ADMIN — RIVAROXABAN SCH MG: 20 TABLET, FILM COATED ORAL at 07:55

## 2017-10-06 RX ADMIN — LOSARTAN POTASSIUM SCH MG: 25 TABLET, FILM COATED ORAL at 07:53

## 2017-10-06 RX ADMIN — Medication SCH TAB: at 07:54

## 2017-10-06 RX ADMIN — BENZTROPINE MESYLATE SCH MG: 1 TABLET ORAL at 07:53

## 2017-10-06 NOTE — PN
MHU: Group Therapy Note





- Service Type


Service Type: 22493 Group Psychotherapy - Cognitive Behavioral Group Therapy (

CBT):Patient was attentive and participatory in CBT programming this morning, 

and remained in good behavioral control.  Patient expressed positive insights 

regarding relevant treatment interventions and goals.

## 2017-10-06 NOTE — DS
Subjective





- Subjective


Service Types: [address] [facility] Day Mgmt simple under 30 min


Subjective: 





Patient continues to be full to bright in affect. He has been no behavioral 

issue. He has been interactive with staff and cooperative. Patient noted to be 

visible in the milieu, social with peers, and attending groups. Patient  

continues to display logical TP and TC. Patient denies SI/HI and AH/VH.  

Patient is A&Ox4. TC is future oriented. in TC.  Patient is psychiatrically 

stable. Discharge plan has been discussed and patient is amenable and 

acknowledges understanding. Patient instructed to call the crisis hotline, 911, 

or self present to a local ED if agitation, paranoia, or SI/HI occurs. Patient 

was amenable and acknowledged understanding of community supports.








Objective





- Appearance


Appearance: Well Developed/Nourished


Dysmorphic Features: No


Hygiene: Normal


Grooming: Fairly Well Kept





- Behavior


Psychomotor Activities: Normal


Exhibits Abnormal Movement: No





- Attitude and Relatedness


Attitude and Relatedness: Cooperative


Eye Contact: Fair





- Speech


Quality: Unpressured


Latencies: Normal





Treatment Course & Assessment


Clinical Course & Impression: 








HOSPITAL COURSE:


-------------------------


Patient is a 61yo male with PPHx significant for Schizophrenia who presented to 

[facility] ED due to ACT team noting patient displaying disorganized thinking and 

paranoid thought. Patient denies paranoia. He does not interact with staff 

except to demand discharge. Patient declined interview with this provider. 

Patient is noted to be on coumadin and RN reported to this provider that his 

admission INR was 1.1 on 9/7/17. Patient's Lithium level also noted to be 

subtherapeutic on admission at 0.10. Patient is on Coumadin therapy for Afib 

but there seems to be 2 orders entered for admission at the same time. Patient 

has likely been non-compliant with these meds as well. Patient noted to be non-

compliant with ACT team. Patient has had multiple psychiatric admissions most 2/

2 becoming med non-compliant. Patient admitted this Saturday for decompensated 

paranoid Schizophrenia. 


D/C'd Johnnie as patient historically has asked to be taken off this med. Re-

start Risperdal with plan to initiate Invega Sustenna prior to his discharge.


 


On admission, patient is visible in the milieu pacing. He is noted to be 

paranoid of staff and continued declining initiation in conversation. Patient 

educated on why this provider is concerned with his med non-compliance, with 

special focus on his dx of afib and his increased risk of MI or CVA with each 

day he does not take his coumadin anticoagulation therapy. Patient stated, "I 

don't have atrial fibrillation" and walked away. Risperdal re-started, but 

patient has not been med compliant. Patient has attended to ADLs, is up for 

meals, and is able to verbalize his needs to staff.





Early in admission patient's symptomatic due to medical non-compliance. 

Collateral information obtained from PCP-Dr. Rachid Luevano#[phone]  

regarding anti-coagulation. PCP recommended D/C of Coumadin as patient non-

compliant x 2weeks and re-start of Coumadin would reguire an addition 4 -5 days 

to become therapeutic. Xarelto compliance would yield immediate coverage. 

Patient encouraged from that point to be compliant with Xarelto 20mg po daily 

for anticoagulation therapy dx-Afib.  





Process for TOO started and hearing held on 9/22/17. Patient court ordered to 

take Risperdal at 3mg po qhs for psychosis with plan to initiate Invega 

Sustenna prior to discharge.


Lithium at 600mg po qhs for mood stabilization/augmentation and Xarelto 20mg po 

daily for anticoagulation therapy dx-Afib. Patient quickly showed symptomatic 

improvement. Patient began to be interactive with staff and cooperative on the 

unit. Patient also noted to be visible in the milieu, social with selcet peers, 

and attending groups. 





On day of discharge, displayed logical TP and TC. Patient denies SI/HI and AH/

VH.  Patient is A&Ox4. TC is future oriented. in TC.  Patient is 

psychiatrically stable. Discharge plan has been discussed and patient is 

amenable and acknowledges understanding. Patient instructed to call the crisis 

hotline, 911, or self present to a local ED if agitation, paranoia, or SI/HI 

occurs. Patient was amenable and acknowledged understanding of community 

supports. Patient will be discharged home.











PERTINENT LABS:


--------------------





 Laboratory Tests











  09/08/17 09/08/17 09/08/17





  18:05 18:05 18:05


 


WBC   4.9 


 


RBC   3.90 L 


 


Hgb   13.4 L 


 


Hct   38 L 


 


MCV   97 H 


 


MCH   35 H 


 


MCHC   36 


 


RDW   13 


 


Plt Count   143 L 


 


MPV   8 


 


Neut % (Auto)   63.2 


 


Lymph % (Auto)   23.6 L 


 


Mono % (Auto)   10.3 H 


 


Eos % (Auto)   2.4 


 


Baso % (Auto)   0.5 


 


Absolute Neuts (auto)   3.1 


 


Absolute Lymphs (auto)   1.2 


 


Absolute Monos (auto)   0.5 


 


Absolute Eos (auto)   0.1 


 


Absolute Basos (auto)   0 


 


Absolute Nucleated RBC   0 


 


Nucleated RBC %   0.1 


 


INR (Anticoag Therapy)    1.16 H


 


APTT   


 


Sodium  137  


 


Potassium  3.6  


 


Chloride  104  


 


Carbon Dioxide  26  


 


Anion Gap  7  


 


BUN  11  


 


Creatinine  0.82  


 


Est GFR ( Amer)  122.8  


 


Est GFR (Non-Af Amer)  95.5  


 


BUN/Creatinine Ratio  13.4  


 


Glucose  101 H  


 


Hemoglobin A1c   


 


Calcium  9.1  


 


Total Bilirubin  1.10 H  


 


AST  64 H  


 


ALT  66 H  


 


Alkaline Phosphatase  50  


 


Total Protein  6.4  


 


Albumin  3.9  


 


Globulin  2.5  


 


Albumin/Globulin Ratio  1.6  


 


Triglycerides   


 


Cholesterol   


 


LDL Cholesterol   


 


HDL Cholesterol   


 


TSH  0.95  


 


Urine Color   


 


Urine Appearance   


 


Urine pH   


 


Ur Specific Gravity   


 


Urine Protein   


 


Urine Ketones   


 


Urine Blood   


 


Urine Nitrate   


 


Urine Bilirubin   


 


Urine Urobilinogen   


 


Ur Leukocyte Esterase   


 


Urine Glucose   


 


Salicylates  < 2.50  


 


Urine Opiates Screen   


 


Acetaminophen  < 15  


 


Ur Barbiturates Screen   


 


Ur Phencyclidine Scrn   


 


Ur Amphetamines Screen   


 


U Benzodiazepines Scrn   


 


Lithium  < 0.10 L  


 


Urine Cocaine Screen   


 


U Cannabinoids Screen   


 


Serum Alcohol  < 10  














  09/08/17 09/08/17 09/22/17





  18:40 18:40 17:47


 


WBC   


 


RBC   


 


Hgb   


 


Hct   


 


MCV   


 


MCH   


 


MCHC   


 


RDW   


 


Plt Count   


 


MPV   


 


Neut % (Auto)   


 


Lymph % (Auto)   


 


Mono % (Auto)   


 


Eos % (Auto)   


 


Baso % (Auto)   


 


Absolute Neuts (auto)   


 


Absolute Lymphs (auto)   


 


Absolute Monos (auto)   


 


Absolute Eos (auto)   


 


Absolute Basos (auto)   


 


Absolute Nucleated RBC   


 


Nucleated RBC %   


 


INR (Anticoag Therapy)   


 


APTT   


 


Sodium    135


 


Potassium    TNP


 


Chloride    104


 


Carbon Dioxide    26


 


Anion Gap    5


 


BUN    18


 


Creatinine    0.83


 


Est GFR ( Amer)    121.1


 


Est GFR (Non-Af Amer)    94.2


 


BUN/Creatinine Ratio    21.7 H


 


Glucose    104 H


 


Hemoglobin A1c   


 


Calcium    9.4


 


Total Bilirubin    0.80


 


AST    TNP


 


ALT    20


 


Alkaline Phosphatase    70


 


Total Protein    6.8


 


Albumin    4.0


 


Globulin    2.8


 


Albumin/Globulin Ratio    1.4


 


Triglycerides    194


 


Cholesterol    157


 


LDL Cholesterol    69


 


HDL Cholesterol    49.2


 


TSH   


 


Urine Color   Straw 


 


Urine Appearance   Clear 


 


Urine pH   5.0 


 


Ur Specific Gravity   1.005 L 


 


Urine Protein   Negative 


 


Urine Ketones   Trace H 


 


Urine Blood   Negative 


 


Urine Nitrate   Negative 


 


Urine Bilirubin   Negative 


 


Urine Urobilinogen   Negative 


 


Ur Leukocyte Esterase   Negative 


 


Urine Glucose   Negative 


 


Salicylates   


 


Urine Opiates Screen  None detected  


 


Acetaminophen   


 


Ur Barbiturates Screen  None detected  


 


Ur Phencyclidine Scrn  None detected  


 


Ur Amphetamines Screen  None detected  


 


U Benzodiazepines Scrn  None detected  


 


Lithium   


 


Urine Cocaine Screen  None detected  


 


U Cannabinoids Screen  None detected  


 


Serum Alcohol   














  09/22/17 09/22/17 09/22/17





  17:47 17:47 18:30


 


WBC   8.2 


 


RBC   4.30 


 


Hgb   14.8 


 


Hct   42 


 


MCV   96 H 


 


MCH   34 H 


 


MCHC   36 


 


RDW   13 


 


Plt Count   158 


 


MPV   9 


 


Neut % (Auto)   68.3 


 


Lymph % (Auto)   21.4 L 


 


Mono % (Auto)   8.4 


 


Eos % (Auto)   1.4 


 


Baso % (Auto)   0.5 


 


Absolute Neuts (auto)   5.6 


 


Absolute Lymphs (auto)   1.8 


 


Absolute Monos (auto)   0.7 


 


Absolute Eos (auto)   0.1 


 


Absolute Basos (auto)   0 


 


Absolute Nucleated RBC   0.01 


 


Nucleated RBC %   0.2 


 


INR (Anticoag Therapy)   


 


APTT   


 


Sodium   


 


Potassium    3.8


 


Chloride   


 


Carbon Dioxide   


 


Anion Gap   


 


BUN   


 


Creatinine   


 


Est GFR ( Amer)   


 


Est GFR (Non-Af Amer)   


 


BUN/Creatinine Ratio   


 


Glucose   


 


Hemoglobin A1c  4.8  


 


Calcium   


 


Total Bilirubin   


 


AST    18


 


ALT   


 


Alkaline Phosphatase   


 


Total Protein   


 


Albumin   


 


Globulin   


 


Albumin/Globulin Ratio   


 


Triglycerides   


 


Cholesterol   


 


LDL Cholesterol   


 


HDL Cholesterol   


 


TSH   


 


Urine Color   


 


Urine Appearance   


 


Urine pH   


 


Ur Specific Gravity   


 


Urine Protein   


 


Urine Ketones   


 


Urine Blood   


 


Urine Nitrate   


 


Urine Bilirubin   


 


Urine Urobilinogen   


 


Ur Leukocyte Esterase   


 


Urine Glucose   


 


Salicylates   


 


Urine Opiates Screen   


 


Acetaminophen   


 


Ur Barbiturates Screen   


 


Ur Phencyclidine Scrn   


 


Ur Amphetamines Screen   


 


U Benzodiazepines Scrn   


 


Lithium   


 


Urine Cocaine Screen   


 


U Cannabinoids Screen   


 


Serum Alcohol   














  09/27/17 09/27/17 09/27/17





  07:56 07:56 07:56


 


WBC  4.7  


 


RBC  4.22  


 


Hgb  14.5  


 


Hct  41 L  


 


MCV  97 H  


 


MCH  34 H  


 


MCHC  36  


 


RDW  13  


 


Plt Count  132 L  


 


MPV  9  


 


Neut % (Auto)  66.0  


 


Lymph % (Auto)  21.9 L  


 


Mono % (Auto)  9.2 H  


 


Eos % (Auto)  2.4  


 


Baso % (Auto)  0.5  


 


Absolute Neuts (auto)  3.1  


 


Absolute Lymphs (auto)  1.0  


 


Absolute Monos (auto)  0.4  


 


Absolute Eos (auto)  0.1  


 


Absolute Basos (auto)  0  


 


Absolute Nucleated RBC  0  


 


Nucleated RBC %  0  


 


INR (Anticoag Therapy)    0.98


 


APTT    34.0


 


Sodium   138 


 


Potassium   4.1 


 


Chloride   107 


 


Carbon Dioxide   27 


 


Anion Gap   4 


 


BUN   14 


 


Creatinine   0.89 


 


Est GFR ( Amer)   111.8 


 


Est GFR (Non-Af Amer)   86.9 


 


BUN/Creatinine Ratio   15.7 


 


Glucose   101 H 


 


Hemoglobin A1c   


 


Calcium   9.3 


 


Total Bilirubin   0.80 


 


AST   18 


 


ALT   18 


 


Alkaline Phosphatase   69 


 


Total Protein   6.6 


 


Albumin   4.0 


 


Globulin   2.6 


 


Albumin/Globulin Ratio   1.5 


 


Triglycerides   36 


 


Cholesterol   146 


 


LDL Cholesterol   87 


 


HDL Cholesterol   51.4 


 


TSH   


 


Urine Color   


 


Urine Appearance   


 


Urine pH   


 


Ur Specific Gravity   


 


Urine Protein   


 


Urine Ketones   


 


Urine Blood   


 


Urine Nitrate   


 


Urine Bilirubin   


 


Urine Urobilinogen   


 


Ur Leukocyte Esterase   


 


Urine Glucose   


 


Salicylates   


 


Urine Opiates Screen   


 


Acetaminophen   


 


Ur Barbiturates Screen   


 


Ur Phencyclidine Scrn   


 


Ur Amphetamines Screen   


 


U Benzodiazepines Scrn   


 


Lithium   0.40 L 


 


Urine Cocaine Screen   


 


U Cannabinoids Screen   


 


Serum Alcohol   














  09/27/17





  07:56


 


WBC 


 


RBC 


 


Hgb 


 


Hct 


 


MCV 


 


MCH 


 


MCHC 


 


RDW 


 


Plt Count 


 


MPV 


 


Neut % (Auto) 


 


Lymph % (Auto) 


 


Mono % (Auto) 


 


Eos % (Auto) 


 


Baso % (Auto) 


 


Absolute Neuts (auto) 


 


Absolute Lymphs (auto) 


 


Absolute Monos (auto) 


 


Absolute Eos (auto) 


 


Absolute Basos (auto) 


 


Absolute Nucleated RBC 


 


Nucleated RBC % 


 


INR (Anticoag Therapy) 


 


APTT 


 


Sodium 


 


Potassium 


 


Chloride 


 


Carbon Dioxide 


 


Anion Gap 


 


BUN 


 


Creatinine 


 


Est GFR ( Amer) 


 


Est GFR (Non-Af Amer) 


 


BUN/Creatinine Ratio 


 


Glucose 


 


Hemoglobin A1c  4.9


 


Calcium 


 


Total Bilirubin 


 


AST 


 


ALT 


 


Alkaline Phosphatase 


 


Total Protein 


 


Albumin 


 


Globulin 


 


Albumin/Globulin Ratio 


 


Triglycerides 


 


Cholesterol 


 


LDL Cholesterol 


 


HDL Cholesterol 


 


TSH 


 


Urine Color 


 


Urine Appearance 


 


Urine pH 


 


Ur Specific Gravity 


 


Urine Protein 


 


Urine Ketones 


 


Urine Blood 


 


Urine Nitrate 


 


Urine Bilirubin 


 


Urine Urobilinogen 


 


Ur Leukocyte Esterase 


 


Urine Glucose 


 


Salicylates 


 


Urine Opiates Screen 


 


Acetaminophen 


 


Ur Barbiturates Screen 


 


Ur Phencyclidine Scrn 


 


Ur Amphetamines Screen 


 


U Benzodiazepines Scrn 


 


Lithium 


 


Urine Cocaine Screen 


 


U Cannabinoids Screen 


 


Serum Alcohol 

















Consultants:


-------------


none








Discharge Meds:


-----------------


 


 Home Medications











 Medication  Instructions  Recorded  Confirmed  Type


 


Benztropine TAB* [Cogentin TAB*] 0.5 mg PO BID #15 tab 10/06/17  Rx


 


Calcium/Vitamin D /125* 2 tab PO BID #120 tab 10/06/17  Rx





[Oscal D /125*]    


 


Lithium Carbonate TAB* 900 mg PO BEDTIME #90 tab 10/06/17  Rx


 


Losartan TAB* [Cozaar TAB*] 25 mg PO DAILY #30 tab 10/06/17  Rx


 


Melatonin (NF) 3 mg PO BEDTIME #30 tab 10/06/17  Rx


 


Nicotine Inhaler* 10 mg INH Q2H PRN #60 amp 10/06/17  Rx


 


Polyethylene Glycol 3350* 17 gm PO DAILY PRN #15 packet 10/06/17  Rx





[Miralax*]    


 


Rivaroxaban TAB(*) [Xarelto 20 mg] 20 mg PO DAILY #30 tab 10/06/17  Rx


 


Senna TAB* [Senokot TAB*] 1 tab PO BID PRN #60 tab 10/06/17  Rx


 


Vitamin THERAPEUTIC TAB* 1 tab PO DAILY #30 tab 10/06/17  Rx





[Theragran TAB*]    


 


clonazePAM TAB(*) [Klonopin TAB(*)] 1 mg PO BEDTIME #30 tab MDD 1mg 10/06/17  Rx


 


risperiDONE TAB* [Risperdal*] 6 mg PO BEDTIME #60 tab 10/06/17  Rx














Clear for Discharge: Adequate Clinical Respons, Acceptable Safety Profile, Low 

Utility of Inpt Care


Inpatient DSM-IV Dx: Schizophrenia





Discharge Planning





- Discharge Planning


Discharge Plan: Outpatient Follow Up


[facility]


Recommendations for Continuing Care: Medication Management, Routine Metabolic 

Monitoring, Primary Care Followup, Specialty Followup


Medications: 


 Current Medications





Acetaminophen (Tylenol Tab*)  650 mg PO Q4H PRN


   PRN Reason: PAIN or TEMP > 101 F


Al Hydrox/Mg Hydrox/Simethicone (Maalox Plus*)  30 ml PO Q4H PRN


   PRN Reason: INDIGESTION


   Last Admin: 09/29/17 09:22 Dose:  30 ml


Benztropine Mesylate (Cogentin Tab*)  0.5 mg PO BID [facility]


   Last Admin: 10/06/17 07:53 Dose:  0.5 mg


Calcium/Vitamin D (Oscal D Tab 250/125*)  2 tab PO BID [facility]


   Last Admin: 10/06/17 07:54 Dose:  2 tab


Clonazepam (Klonopin Tab(*))  1 mg PO BEDTIME [facility]


   Last Admin: 10/05/17 19:58 Dose:  1 mg


Lithium Carbonate (Lithium Carbonate Tab*)  900 mg PO BEDTIME [facility]


   Last Admin: 10/05/17 19:58 Dose:  900 mg


Losartan Potassium (Cozaar Tab*)  25 mg PO DAILY [facility]


   Last Admin: 10/06/17 07:53 Dose:  25 mg


Melatonin (Melatonin (Nf))  3 mg PO BEDTIME [facility]


   Last Admin: 10/05/17 20:01 Dose:  Not Given


Multi-Ingredient Liniment/Rub (Eric De Luna*)  1 applic TOPICAL TID PRN


   PRN Reason: MUSCLE ACHE


   Last Admin: 10/03/17 09:25 Dose:  1 applic


Multivitamins (Theragran Tab*)  1 tab PO DAILY [facility]


   Last Admin: 10/06/17 07:55 Dose:  1 tab


Nicotine Polacrilex (Nicotine Gum*)  2 mg PO Q2H PRN


   PRN Reason: CRAVING


Polyethylene Glycol/Electrolytes (Miralax*)  17 gm PO DAILY PRN


   PRN Reason: CONSTIPATION


   Last Admin: 10/04/17 08:10 Dose:  17 gm


Risperidone (Risperdal*)  6 mg PO BEDTIME [facility]


   Last Admin: 10/05/17 19:59 Dose:  6 mg


Rivaroxaban (Xarelto (*))  20 mg PO DAILY [facility]


   Last Admin: 10/06/17 07:55 Dose:  20 mg


Senna (Senokot Tab*)  1 tab PO BID PRN


   PRN Reason: CONSTIPATION


   Last Admin: 10/05/17 08:20 Dose:  1 tab








Discharge Planning: 


Prescriptions provided for discharge                  [x] Yes   [] No   





Follow up care details as per social work arrangements.


Patient response to discharge plan:   


                                                                 [] eager for 

discharge


                                    [x] agreeable with discharge plan


                                  [] ambivalent about discharge


                                   [] disagrees with discharge today

## 2018-10-12 ENCOUNTER — HOSPITAL ENCOUNTER (INPATIENT)
Dept: HOSPITAL 25 - ED | Age: 63
LOS: 31 days | Discharge: PSYCHIATRIC OR SUBSTANCE USE FACILITY | DRG: 885 | End: 2018-11-12
Attending: PSYCHIATRY & NEUROLOGY | Admitting: PSYCHIATRY & NEUROLOGY
Payer: MEDICARE

## 2018-10-12 DIAGNOSIS — F41.9: ICD-10-CM

## 2018-10-12 DIAGNOSIS — Z88.8: ICD-10-CM

## 2018-10-12 DIAGNOSIS — Z88.0: ICD-10-CM

## 2018-10-12 DIAGNOSIS — F25.0: Primary | ICD-10-CM

## 2018-10-12 DIAGNOSIS — Z91.018: ICD-10-CM

## 2018-10-12 DIAGNOSIS — Z91.048: ICD-10-CM

## 2018-10-12 DIAGNOSIS — Z91.19: ICD-10-CM

## 2018-10-12 DIAGNOSIS — I45.81: ICD-10-CM

## 2018-10-12 DIAGNOSIS — I35.2: ICD-10-CM

## 2018-10-12 DIAGNOSIS — I48.91: ICD-10-CM

## 2018-10-12 DIAGNOSIS — Z81.8: ICD-10-CM

## 2018-10-12 LAB
BASOPHILS # BLD AUTO: 0.1 10^3/UL (ref 0–0.2)
EOSINOPHIL # BLD AUTO: 0.1 10^3/UL (ref 0–0.6)
HCT VFR BLD AUTO: 38 % (ref 42–52)
HGB BLD-MCNC: 13.6 G/DL (ref 14–18)
LITHIUM SERPL-SCNC: < 0.1 MMOL/L (ref 0.6–1.2)
LYMPHOCYTES # BLD AUTO: 1 10^3/UL (ref 1–4.8)
MCH RBC QN AUTO: 35 PG (ref 27–31)
MCHC RBC AUTO-ENTMCNC: 36 G/DL (ref 31–36)
MCV RBC AUTO: 96 FL (ref 80–94)
MONOCYTES # BLD AUTO: 0.6 10^3/UL (ref 0–0.8)
NEUTROPHILS # BLD AUTO: 3.5 10^3/UL (ref 1.5–7.7)
NRBC # BLD AUTO: 0 10^3/UL
NRBC BLD QL AUTO: 0
PLATELET # BLD AUTO: 145 10^3/UL (ref 150–450)
RBC # BLD AUTO: 3.94 10^6/UL (ref 4–5.4)
WBC # BLD AUTO: 5.2 10^3/UL (ref 3.5–10.8)

## 2018-10-12 PROCEDURE — 80329 ANALGESICS NON-OPIOID 1 OR 2: CPT

## 2018-10-12 PROCEDURE — 99233 SBSQ HOSP IP/OBS HIGH 50: CPT

## 2018-10-12 PROCEDURE — 99238 HOSP IP/OBS DSCHRG MGMT 30/<: CPT

## 2018-10-12 PROCEDURE — 99284 EMERGENCY DEPT VISIT MOD MDM: CPT

## 2018-10-12 PROCEDURE — 36415 COLL VENOUS BLD VENIPUNCTURE: CPT

## 2018-10-12 PROCEDURE — 99232 SBSQ HOSP IP/OBS MODERATE 35: CPT

## 2018-10-12 PROCEDURE — 99231 SBSQ HOSP IP/OBS SF/LOW 25: CPT

## 2018-10-12 PROCEDURE — G0480 DRUG TEST DEF 1-7 CLASSES: HCPCS

## 2018-10-12 PROCEDURE — 83036 HEMOGLOBIN GLYCOSYLATED A1C: CPT

## 2018-10-12 PROCEDURE — 80178 ASSAY OF LITHIUM: CPT

## 2018-10-12 PROCEDURE — 93005 ELECTROCARDIOGRAM TRACING: CPT

## 2018-10-12 PROCEDURE — 80320 DRUG SCREEN QUANTALCOHOLS: CPT

## 2018-10-12 PROCEDURE — 80061 LIPID PANEL: CPT

## 2018-10-12 PROCEDURE — 93306 TTE W/DOPPLER COMPLETE: CPT

## 2018-10-12 PROCEDURE — 84443 ASSAY THYROID STIM HORMONE: CPT

## 2018-10-12 PROCEDURE — 99222 1ST HOSP IP/OBS MODERATE 55: CPT

## 2018-10-12 PROCEDURE — 85025 COMPLETE CBC W/AUTO DIFF WBC: CPT

## 2018-10-12 PROCEDURE — 80053 COMPREHEN METABOLIC PANEL: CPT

## 2018-10-12 PROCEDURE — 80164 ASSAY DIPROPYLACETIC ACD TOT: CPT

## 2018-10-12 RX ADMIN — LITHIUM CARBONATE SCH: 300 TABLET ORAL at 21:08

## 2018-10-12 RX ADMIN — RISPERIDONE SCH: 3 TABLET ORAL at 21:08

## 2018-10-12 RX ADMIN — PROPRANOLOL HYDROCHLORIDE SCH: 10 TABLET ORAL at 21:08

## 2018-10-12 RX ADMIN — CLONAZEPAM SCH: 0.5 TABLET ORAL at 21:08

## 2018-10-12 NOTE — ED
Psychiatric Complaint





- HPI Summary


HPI Summary: 





This pt is a 62 y/o male presenting to [facility] via EMS for a mental health 

evaluation. Leann, evaluator, sent the pt to the ED on a 9.60 court order 

from the director of [facility] for noncompliance with 

medications and a mental health evaluation. Pt has hx of schizophrenia. Pt is 

supposed to be on Lithium, clonazepam, Risperdal, Inderal, Invega sustenna. 

Leann reports pt did receive Invega Sustenna as this is given IM once a 

month. Pt reports "I'm supposed to take natural medicine." Denies SI or HI 

thoughts/plan. 





- History Of Current Complaint


Time Seen by Provider: 10/12/18 14:45


Hx Obtained From: Patient, Other: - Leann from [facility]


Onset/Duration: Lasting Days, Still Present


Timing: Days


Severity Currently: Moderate


Aggravating Factor(s): Medication Non-compliance


Alleviating Factor(s): Nothing


Related History: Positive For: Prior Psychiatric Issues


Has Suicidal: Denies: Thoughts, With A Plan


Has Homicidal: Denies: Thoughts, With A Plan





- Allergies/Home Medications


Allergies/Adverse Reactions: 


 Allergies











Allergy/AdvReac Type Severity Reaction Status Date / Time


 


haloperidol [From Haldol] Allergy  Unknown Verified 02/12/18 11:48





   Reaction  





   Details  


 


mold Allergy  Unknown Verified 10/12/18 19:57





   Reaction  





   Details  


 


Penicillins Allergy  Unknown Verified 10/12/18 19:57





   Reaction  





   Details  


 


pork derived (porcine) Allergy  Unknown Verified 02/12/18 11:48





   Reaction  





   Details  


 


trifluoperazine Allergy  Unknown Verified 02/12/18 11:48





   Reaction  





   Details  











Home Medications: 


 Home Medications





clonazePAM TAB(*) [Klonopin TAB(*)] 1 mg PO BID MDD 1mg 10/12/18 [History 

Confirmed 10/12/18]











PMH/Surg Hx/FS Hx/Imm Hx


Endocrine/Hematology History: 


   Denies: Hx Anticoagulant Therapy


Sensory History: Reports: Hx Contacts or Glasses


   Denies: Hx Cataracts, Hx Hearing Aid


Opthamlomology History: Reports: Hx Contacts or Glasses


   Denies: Hx Cataracts


Neurological History: Reports: Other Neuro Impairments/Disorders - hx of 

benztropine tx


   Denies: Hx Seizures


Psychiatric History: Reports: Hx Anxiety, Hx Depression, Hx Inpatient Treatment 

- [facility] [place] [facility] [place], Hx Community Mental Health Tx, Hx Schizophrenia - hx 

of navane tx. , Hx Bipolar Disorder, Hx of Violent Episodes Against Others, 

Other Psychiatric Issues/Disorders - Psychosis





- Immunization History


Date of Tetanus Vaccine: unk


Infectious Disease History: 


   Denies: Traveled Outside the [place] in Last 30 Days





- Family History


Known Family History: Positive: Unknown - Patient is a LEVEL 5 CAVEAT


Family History: schizophrenia, depression





- Social History


Alcohol Use: refuses to cooperate with assessment


Substance Use Type: Reports: None


Smoking Status (MU): Never Smoked Tobacco





Review of Systems





- ROS Summary


Review of Systems Summary: 





ROS IS LIMITED DUE TO LEVEL 5 CAVEAT - poor historian


Negative: Fever, Chills


Psychological: Other - schizophrenia, noncompliant with meds


All Other Systems Reviewed And Are Negative: No





Physical Exam





- Summary


Physical Exam Summary: 





VITAL SIGNS: Reviewed.


GENERAL: Patient is a well-developed and nourished male who is lying 

comfortable in the stretcher. Patient is not in any acute respiratory distress.


HEAD AND FACE: No signs of trauma. No ecchymosis, hematomas or skull 

depressions. No sinus tenderness.


EYES: PERRLA, EOMI x 2, No injected conjunctiva, no nystagmus.


EARS: Hearing grossly intact. Ear canals and tympanic membranes are within 

normal limits.


MOUTH: Oropharynx within normal limits.


NECK: Supple, trachea is midline, no adenopathy, no JVD, no carotid bruit, no c-

spine tenderness, neck with full ROM.


CHEST: Symmetric, no tenderness at palpation


LUNGS: Clear to auscultation bilaterally. No wheezing or crackles.


CVS: Regular rate and rhythm, S1 and S2 present, no murmurs or gallops 

appreciated.


ABDOMEN: Soft, non-tender. No signs of distention. No rebound, no guarding, and 

no masses palpated. Bowel sounds are normal.


EXTREMITIES: FROM in all major joints, no edema, no cyanosis or clubbing.


NEURO: Alert and oriented x 3. No acute neurological deficits. Speech is normal 

and follows commands.


SKIN: Dry and warm


PSYCH: Tangential thinking. Denies SI or HI thoughts/plan. Pt is a poor 

historian. He is calm. 


Triage Information Reviewed: Yes


Vital Signs Reviewed: Yes


Completion Of Physical Exam Limited Due To: Level 5 - pt is a poor historian





Diagnostics





- Laboratory


Result Diagrams: 


 10/12/18 15:25





 10/12/18 15:25


Lab Statement: Any lab studies that have been ordered have been reviewed, and 

results considered in the medical decision making process.





Re-Evaluation





- Re-Evaluation


  ** First Eval


Re-Evaluation Time: 16:10


Comment: Pt is medically cleared.





Course/Dx





- Course


Assessment/Plan: This pt is a 62 y/o male presenting to [facility] via EMS for a 

mental health evaluation. Leann, evaluator, sent the pt to the ED on a 9.60 

court order from the director of [facility] for 

noncompliance with medications and a mental health evaluation. Pt has hx of 

schizophrenia. Pt is supposed to be on Lithium, clonazepam, Risperdal, Inderal, 

Invega sustenna. Leann reports pt did receive Invega Sustenna as this is 

given IM once a month. Pt reports "I'm supposed to take natural medicine." 

Denies SI or HI thoughts/plan.  Blood work w/o a significant abnormality.  He 

is medically cleared.  He is awaiting for a MHE.  Patient is hemodynamically 

stable and A+O x 3.  Pt had a mental health evaluation and his case was 

reviewed by Dr. Willson, psychiatrist. Dr. Willson [place] the [place] [facility] 

[place] with diagnosis of psychosis.





- Differential Dx/Clinical Impression


Differential Diagnosis/HQI/PQRI: Positive: Acute Psychosis, Anxiety


Provider Diagnosis: 


 Psychosis








Discharge





- Sign-Out/Discharge


Documenting (check all that apply): Patient Departure - [place] [facility]





- Discharge Plan


Condition: Stable


Disposition: [place]IATRIC [place]AC[place]





- Billing Disposition and Condition


Condition: STABLE


[place] [facility]





- Attestation Statements


Document Initiated by Scribe: Yes


Documenting Scribe: Lorin Mi


Provider For Whom Azul is Documenting (Include Credential): Rachid Moore MD


Scribe Attestation: 


Lorin MUSTAFA, scribed for Rachid Moore MD on 10/13/18 at 0847. 


Scribe Documentation Reviewed: Yes


Provider Attestation: 


The documentation as recorded by the [email or web address] accurately reflects 

the service I personally performed and the decisions made by me, Rachid Moore MD

## 2018-10-13 RX ADMIN — RISPERIDONE SCH: 3 TABLET ORAL at 22:02

## 2018-10-13 RX ADMIN — LITHIUM CARBONATE SCH: 300 TABLET ORAL at 22:01

## 2018-10-13 RX ADMIN — THERA TABS SCH: TAB at 09:25

## 2018-10-13 RX ADMIN — CLONAZEPAM SCH: 0.5 TABLET ORAL at 22:01

## 2018-10-13 RX ADMIN — RIVAROXABAN SCH: 20 TABLET, FILM COATED ORAL at 09:25

## 2018-10-13 RX ADMIN — PROPRANOLOL HYDROCHLORIDE SCH: 10 TABLET ORAL at 22:02

## 2018-10-13 RX ADMIN — PROPRANOLOL HYDROCHLORIDE SCH: 10 TABLET ORAL at 09:25

## 2018-10-13 RX ADMIN — CLONAZEPAM SCH: 0.5 TABLET ORAL at 09:25

## 2018-10-13 RX ADMIN — LOSARTAN POTASSIUM SCH: 25 TABLET, FILM COATED ORAL at 09:25

## 2018-10-13 NOTE — HP
HISTORY AND PHYSICAL:

 

DATE OF ADMISSION:

 

IDENTIFYING DATA:  Mr. Cueva is a 63-year-old mentally disabled  male, well known to this 
unit and surrounding hospitals because of numerous psychiatric hospitalizations in the past.  His las
t hospitalization on this unit was on 09/09/17.  He was brought to the emergency department yesterday
 with the recommendation by his ACT team and he came here in an ambulance.

 

CHIEF COMPLAINT:  The patient has been intimidating and sexually inappropriate in the community and w
as nonadhering to his appointments and/or proposed treatments.

 

HISTORY OF PRESENT ILLNESS:  Rasheed was brought to the emergency room against his will because of the 
above complaints in the community, as he was a risk for violence in the community.  Rasheed has not bee
n complying with his appointments with ACT team as well as refusing to take his medications as per ou
r AOT orders resulting in acute decompensation psychiatrically.  During today's evaluation, Rasheed was
 fine for approximately 30 seconds, answered questions very superficially and then walked away from t
he evaluation saying he did not need any help.  He also declined for us to get his vital signs or do 
a physical examination.  Repeated requests by the nursing for him to come back and let us help him wa
s unsuccessful, he just walked away.  Hence, the rest of history and physical will be from collateral
 information that we have on record.

 

PAST PSYCHIATRIC HISTORY:  Remarkable for numerous psychiatric hospitalizations starting at age 16.  
He was diagnosed with schizophrenia/schizoaffective disorder, bipolar type.  In brief, he has always 
been noncompliant with any treatment recommendations despite the fact that he is on ACT team.

 

PAST MEDICAL HISTORY:  Remarkable for severe aortic stenosis and atrial fibrillation.  Rasheed had alwa
ys declined any interventions offered to him in the past and today.

 

ALLERGIES:  Unknown.

 

FAMILY HISTORY:  From collaterals, it is evident that both sides of his family struggled with mental 
illness, especially due to the diagnosis of schizophrenia, depression and anger management difficulti
es.  His older brother is also known to suffer from mental illness; however, there is no family histo
ry of suicide.

 

PERSONAL AND SOCIAL HISTORY:  Rasheed has been mentally disabled since teenage years, never .  N
o children.  He is currently followed by [facility] ACT team. He is unemployed and is totally depen
dent on public assistance.

 

                               PHYSICAL EXAMINATION

 

Physical exam was offered.  Rasheed even did not allow us to do his vital signs, let alone do anything 
else.  Review of emergency department's physical exam did not indicate any problem whatsoever includi
ng his known diagnosis of severe aortic stenosis as well as atrial fibrillation.  There were no vital
s available on record at that time; however, he does not appear to be in any kind of physical distres
s and we will continue to pursue to accomplish everything to make sure he is physically safe.

 

 DIAGNOSTIC STUDIES/LAB DATA:  As such, an EKG was ordered along with an echocardiogram.  We may even
 request for a hospitalist consult in case EKG or echocardiogram shows any abnormal reports.

 

Labs done in the emergency room were unremarkable.  His WBC count was 5.2, hemoglobin 13.6, hematocri
t 38 which is slightly lower than normal, platelet count is 145 which is also on the lower end of nor
mal.  Comprehensive metabolic profile shows a sodium level of 139, potassium 3.6, chloride 105, carbo
n dioxide 28, BUN 16, creatinine 0.5.  Rest of the report was also unremarkable.

 

MENTAL STATUS EXAMINATION:  This 63-year-old male who is appropriately dressed, poorly groomed, alert
 and oriented to place and person.  Due to his uncooperativeness, mental status is only observed.  Th
e patient declined to cooperate with answering any questions.  He appears to be internally preoccupie
d, very tense, irritable, and severely dysphoric.  Unable to sit still, pacing fast in the hallways, 
declines to answer any questions.  He definitely is a high risk for violence if not observed very hi
sely.

 

SUMMARY:  This 63-year-old mentally disabled  male known to this facility from multiple prio
r psychiatric hospitalizations, who also suffers from aortic stenosis and atrial fibrillation, was br
ought to the emergency room this time once again for appointment and treatment nonadherence even for 
his ACT team.  He is extremely tense, irritable, and guarded.

 

DIAGNOSTIC IMPRESSION:

 

MENTAL HEALTH DIAGNOSIS:  Schizoaffective disorder, bipolar type.

 

PHYSICAL HEALTH DIAGNOSES:

1.  Aortic stenosis.

2.  History of atrial fibrillation.

 

TREATMENT RECOMMENDATIONS:  Rasheed will remain hospitalized on the behavioral science unit for his and
 others safety in the community.  His code status will remain full.  Supportive milieu, individual, a
nd group therapy will be tried; hopefully, Rasheed will cooperate.  He will be monitored very closely f
or possible violence on the unit.  We have requested an EKG and echocardiogram and we will request fo
r a medical consult to be on the safer side.  Understandably, he may decline any of the above.  He is
 on long-acting injectable, which he received in recent past and the next one is already due; however
, he is declining anything injectable or p.o.  We will once again pursue treatment over objection and
 I will recommend treatment over objections not only for psychiatric reasons, also for his medical co
nditions as that is a high risk condition and needs to be addressed if allowed by the law.

 

 

 

[id number] #: [id number]

## 2018-10-14 RX ADMIN — ACETAMINOPHEN PRN MG: 325 TABLET ORAL at 17:42

## 2018-10-14 RX ADMIN — RIVAROXABAN SCH: 20 TABLET, FILM COATED ORAL at 10:41

## 2018-10-14 RX ADMIN — THERA TABS SCH: TAB at 10:41

## 2018-10-14 RX ADMIN — PROPRANOLOL HYDROCHLORIDE SCH: 10 TABLET ORAL at 10:41

## 2018-10-14 RX ADMIN — CLONAZEPAM SCH: 0.5 TABLET ORAL at 20:04

## 2018-10-14 RX ADMIN — CLONAZEPAM SCH: 0.5 TABLET ORAL at 10:41

## 2018-10-14 RX ADMIN — LITHIUM CARBONATE SCH: 300 TABLET ORAL at 20:04

## 2018-10-14 RX ADMIN — LOSARTAN POTASSIUM SCH: 25 TABLET, FILM COATED ORAL at 10:41

## 2018-10-14 RX ADMIN — PROPRANOLOL HYDROCHLORIDE SCH: 10 TABLET ORAL at 20:04

## 2018-10-14 RX ADMIN — RISPERIDONE SCH: 3 TABLET ORAL at 20:04

## 2018-10-15 RX ADMIN — RISPERIDONE SCH: 3 TABLET ORAL at 20:21

## 2018-10-15 RX ADMIN — ACETAMINOPHEN PRN MG: 325 TABLET ORAL at 05:21

## 2018-10-15 RX ADMIN — PROPRANOLOL HYDROCHLORIDE SCH: 10 TABLET ORAL at 20:20

## 2018-10-15 RX ADMIN — THERA TABS SCH: TAB at 10:01

## 2018-10-15 RX ADMIN — LOSARTAN POTASSIUM SCH: 25 TABLET, FILM COATED ORAL at 10:01

## 2018-10-15 RX ADMIN — CLONAZEPAM SCH: 0.5 TABLET ORAL at 20:21

## 2018-10-15 RX ADMIN — PROPRANOLOL HYDROCHLORIDE SCH: 10 TABLET ORAL at 10:01

## 2018-10-15 RX ADMIN — CLONAZEPAM SCH: 0.5 TABLET ORAL at 10:00

## 2018-10-15 RX ADMIN — LITHIUM CARBONATE SCH: 300 TABLET ORAL at 20:20

## 2018-10-15 RX ADMIN — RIVAROXABAN SCH: 20 TABLET, FILM COATED ORAL at 10:01

## 2018-10-15 NOTE — PN
Subjective





- Subjective


Date of Service: 10/15/18


Service Type: 38698 Hosp care 25 min moderate complexity


Subjective: 





Patient was seen by self, discussed with treatment team, chart was reviewed. 

Patient has been non compliant with his medications. Patient reports that he 

has increased in his sex drive and medication is causing that. When asked it 

appeared that patient has not been taking his medications for sometime. Patient 

was in euphoric mood today with hypersexual thoughts. Patient was also 

reporting increase in religiosity. Patient has been unpredictable with his 

behavior on the unit and has been inappropriate with his boundaries towards 

other patients. Patient reportedly has been resistant to treatment and refusing 

PO medications including both for mental and physical health that poses him at 

increased risk of danger to self and others. Patient sleeping has been impaired 

and has been found pacing in the hallways. Patient eating has been fine. 

Patient has been un cooperative with staff. Patient behavior has been 

unpredictable. Patient has been reporting no suicidal or homicidal ideation. 

Patient has paranoid delusion towards others but no psychotic symptoms of 

hallucinations. Patient some how did manage to go and get his echo cardiogram 

done today





Objective





- Appearance


Appearance: Healthy Appearing


Dysmorphic Features: No


Hygiene: Normal


Grooming: Disheveled





- Behavior


Psychomotor Activities: Abnormal-Increased


Exhibits Abnormal Movement: No





- Attitude and Relatedness


Attitude and Relatedness: Psychotically Related


Eye Contact: Fair





- Speech


Quality: Unpressured


Latencies: Normal


Quantity: Terse





- Mood


Patient's Decription of Mood: "Great"





- Affect


Observed Affect: Euphoric


Affect Consistent with: Euphoria





- Thought Process


Patient's Thought Process: Goal Directed


Thought Content: Yes Paranoid Ideation, No Passive Death Wish, No Suicidal 

Planning, No Homicidal Ideation





- Sensorium


Experiencing Hallucinations: No, Sensorium is Clear


Type of Hallucinations: Visual: No, Auditory: No, Command: No





- Level of Consciousness


Level of Consciousness: Alert


Orientation: Yes Intact, Yes Orientated to Time, Yes Orientated to Place, Yes 

Orientated to Person





- Impulse Control


Impulse Control: Poor





- Insight and Judgement


Insight and Judgement: Poor





- Group Participation


Particating in Group Activities: No





- Medication Management


Medication Management Adherence: No





Assessment





- Assessment


Merits Inpatient Hospitalization: For Immediate Safety, For Stabilization, For 

Discharge Planning


Inpatient DSM-V Dx: F25.0


Clinical Impression: 





This 63-year-old mentally disabled  male known to this facility from 

multiple prior psychiatric hospitalizations, who also suffers from aortic 

stenosis and atrial fibrillation, was brought to the emergency room this time 

once again for appointment and treatment nonadherence even for his ACT team.  

He is extremely tense, irritable, and guarded.





Plan





- Plan


Treatment Plan: 


Name: JYOTI STEEN                        


YOB: 1955                        


[id number]


[id number]








- Patient continues to be hospitalized due to non compliance, unpredictable and 

sexually inappropriate behavior in the community, delusional in his thinking.


- Patient's medications were continued but has been non compliant. Multiple 

attempts were made to educate patient about compliance with his treatment. But 

continued to resist. Patient was continued with current medications and will 

pursue with medication over objection.


- Patient will be monitored for improvement and side effects. Risk and benefits 

were discussed.


- Patient was encouraged to continue his participation in the milieu, group and 

individual therapy.


Medications: 


 Current Medications





Acetaminophen (Tylenol Tab*)  650 mg PO Q4H PRN


   PRN Reason: PAIN or TEMP > 101 F


   Last Admin: 10/15/18 05:21 Dose:  650 mg


Al Hydrox/Mg Hydrox/Simethicone (Maalox Plus*)  30 ml PO Q4H PRN


   PRN Reason: INDIGESTION


Clonazepam (Klonopin Tab(*))  0.5 mg PO BID [facility]


   Last Admin: 10/15/18 10:00 Dose:  Not Given


Lithium Carbonate (Lithium Carbonate Tab*)  900 mg PO BEDTIME [facility]


   Last Admin: 10/14/18 20:04 Dose:  Not Given


Losartan Potassium (Cozaar Tab*)  25 mg PO QAM [facility]


   Last Admin: 10/15/18 10:01 Dose:  Not Given


Melatonin (Melatonin)  3 mg PO BEDTIME [facility]


   Last Admin: 10/14/18 20:04 Dose:  Not Given


Multivitamins (Theragran Tab*)  1 tab PO DAILY [facility]


   Last Admin: 10/15/18 10:01 Dose:  Not Given


Propranolol HCl (Inderal Tab*)  10 mg PO BID [facility]


   Last Admin: 10/15/18 10:01 Dose:  Not Given


Risperidone (Risperdal*)  3 mg PO BEDTIME [facility]


   Last Admin: 10/14/18 20:04 Dose:  Not Given


Rivaroxaban (Xarelto(*))  20 mg PO QAM [facility]


   Last Admin: 10/15/18 10:01 Dose:  Not Given

## 2018-10-15 NOTE — ECHO
Patient:      JYOTI STEEN  

[facility] Rec#:     [id number]            :          1955          

Date:         10/15/2018            Age:          63y                 

Account#:     [id number]          Height:       183 cm / 72.0 in

Accession#:   [id number]           Weight:       93 kg / 205.0 lbs

Sex:          M                     BSA:          2.15

Room#:        210                   

Admit Date#:  10/12/2018          

Type:         Inpatient

 

Referring:    Levi Ramos MD

Reading:      Jono Bang MD

Sonographer:  Mila Roberts RDCS,RDMS

CC:           Rachid Luevano MD

______________________________________________________________________

 

Transthoracic Echocardiogram

 

Indication:

AFIB

BP:           129/103

HR:           92

Rhythm:       A-Fib

 

Findings     

History:

AFIB, valvular disease 

 

Technical Comments:

The study quality is good.  

 

Left Ventricle:

The left ventricular chamber size is moderately dilated. Mild to

moderate concentric left ventricular hypertrophy is observed. Global

left ventricular wall motion and contractility are within normal limits.

There is normal left ventricular systolic function. The estimated

ejection fraction is 50-55%.  The assessment of diastolic function is

non-diagnostic. 

 

Left Atrium:

The left atrium is severely dilated. 

 

Right Ventricle:

The right ventricular chamber size and systolic function are within

normal limits. 

 

Right Atrium:

The right atrial cavity size is severely dilated. 

 

Aortic Valve:

The aortic valve is trileaflet. The aortic valve leaflets are mildly

thickened. Systolic excursion of the aortic valve is normal. There is a

trace of aortic regurgitation. There is no evidence of aortic stenosis.

 

 

Mitral Valve:

The mitral valve leaflets are mildly thickened. There is mild mitral

valve prolapse. There is moderate to severe mitral regurgitation.  The

mitral regurgitant jet is eccentric. There is no evidence of mitral

stenosis. 

 

Tricuspid Valve:

The tricuspid valve leaflets are normal.  There is trace tricuspid

regurgitation.  No pulmonary hypertension is noted. 

 

Pulmonic Valve:

The pulmonic valve appears normal. There is a trace pulmonic

regurgitation.  

 

Pericardium:

There is no significant pericardial effusion. 

 

Aorta:

The aortic root appears normal. There is no dilatation of the aortic

arch. 

 

Pulmonary Artery:

The main pulmonary artery appears normal. 

 

Venous:

The inferior vena cava is dilated.  There is a greater than 50%

respiratory change in the inferior vena cava dimension. 

 

Summary:

There was not any prior study for comparison. 

 

Conclusions

Mild to moderate concentric left ventricular hypertrophy is observed.

Global left ventricular wall motion and contractility are within normal

limits.

The estimated ejection fraction is 50-55%. 

The right ventricular chamber size and systolic function are within

normal limits.

There is a trace of aortic regurgitation.

There is no evidence of aortic stenosis.

There is moderate to severe mitral regurgitation. 

The mitral regurgitant jet is eccentric.

There is no evidence of mitral stenosis.

There is trace tricuspid regurgitation. 

There is no significant pericardial effusion.

Further evaluation of the degree of MR may be done with a MARISA

 

Measurements     

Name                    Value         Normal Range            

RVIDd (AP) 2D           2.2 cm        (0.9 - 2.6)             

RVDdMajor (2D)          3.8 cm        (2.2 - 4.4)             

RAd ISD 4CH             6.2 cm        (3.4 - 4.9)             

RA (A4C)W               5 cm          (2.9 - 4.6)             

IVSd (2D)               1.3 cm        (0.6 - 1)               

LVPWd (2D)              1.5 cm        (0.6 - 1)               

LVIDd (2D)              6.1 cm        (3.6 - 5.4)             

LVIDs (2D)              4.5 cm        -                        

LV FS (2D)              27 %          (25 - 45)               

Aortic Annulus          2.5 cm        (1.4 - 2.6)             

Ao root diameter (2D)   2.4 cm        (2.1 - 3.5)             

Ascending Ao            3 cm          (2.1 - 3.4)             

Aortic arch             2.4 cm        (1.8 - 3.4)             

LA dimension (AP) 2D    6 cm          (2.3 - 3.8)             

LAd ISD 4CH             7.7 cm        (2.9 - 5.3)             

LA ISD 4CH W            7.1 cm        (2.5 - 4.5)             

 

Name                    Value         Normal Range            

LA ESV BP (A/L) index   107 ml/m2     -                        

 

Name                    Value         Normal Range            

MV E-wave Vmax          1.1 m/sec     -                        

MV deceleration time    155 msec      -                        

LV septal e' Vmax       0.12 m/sec    -                        

LV lateral e' Vmax      0.14 m/sec    -                        

LV E:e' septal ratio    9 ratio       -                        

LV E:e' lateral ratio   8 ratio       -                        

 

Name                    Value         Normal Range            

AV Vmax                 1.2 m/sec     -                        

AV VTI                  19 cm         -                        

AV peak gradient        6 mmHg        -                        

AV mean gradient        3 mmHg        -                        

LVOT diameter           2.1 cm        -                        

LVOT Vmax               0.6 m/sec     -                        

LVOT VTI                11 cm         -                        

LVOT peak gradient      1.4 mmHg      -                        

LVOT mean gradient      1 mmHg        -                        

WANG (continuity Vmax)   1.7 cm2       -                        

WANG (continuity VTI)    2 cm2         -                        

FLOR Vmax                0.7 m/sec     -                        

 

Name                    Value         Normal Range            

MV Vmax                 1.4 m/sec     -                        

MV VTI                  29 cm         -                        

MV peak gradient        8 mmHg        -                        

MV mean gradient        3 mmHg        -                        

MV PHT                  75 msec       -                        

MR volume (PISA)        18 ml         -                        

MR flow (PISA)          58 ml/sec     -                        

MR ERO                  0.18 cm2      -                        

MR PISA radius          0.5 cm        -                        

MR alias Vmax           37 cm/sec     -                        

MVA (PHT)               2.9 cm2       -                        

MVA (continuity VTI)    1.3 cm2       -                        

 

Name                    Value         Normal Range            

TR Vmax                 2.5 m/sec     -                        

TR peak gradient        25 mmHg       -                        

RAP                     8 mmHg        -                        

RVSP                    33 mmHg       -                        

IVC diameter            2.9 cm        -                        

 

Name                    Value         Normal Range            

PV Vmax                 0.6 m/sec     -                        

PV peak gradient        1.4 mmHg      -                        

 

Electronically signed by: Jono Bang MD on 10/15/2018 13:50:08

## 2018-10-16 RX ADMIN — RIVAROXABAN SCH: 20 TABLET, FILM COATED ORAL at 10:52

## 2018-10-16 RX ADMIN — LITHIUM CARBONATE SCH: 300 TABLET ORAL at 19:55

## 2018-10-16 RX ADMIN — THERA TABS SCH: TAB at 10:52

## 2018-10-16 RX ADMIN — RISPERIDONE SCH: 3 TABLET ORAL at 19:55

## 2018-10-16 RX ADMIN — CLONAZEPAM SCH: 0.5 TABLET ORAL at 10:52

## 2018-10-16 RX ADMIN — LOSARTAN POTASSIUM SCH: 25 TABLET, FILM COATED ORAL at 10:52

## 2018-10-16 RX ADMIN — PROPRANOLOL HYDROCHLORIDE SCH: 10 TABLET ORAL at 19:55

## 2018-10-16 RX ADMIN — PROPRANOLOL HYDROCHLORIDE SCH: 10 TABLET ORAL at 10:52

## 2018-10-16 RX ADMIN — CLONAZEPAM SCH: 0.5 TABLET ORAL at 19:55

## 2018-10-16 NOTE — PN
Subjective





- Subjective


Date of Service: 10/16/18


Service Type: 12502 Hosp care 25 min moderate complexity


Subjective: 





Patient was seen by self, discussed with treatment team, chart was reviewed. 

Patient has been non compliant with his medications. Patient continue to 

rationalizes his not needing any medications. Patient has no insight in his 

medical or psychiatric illness.  Patient has not been taking his medications 

for despite of multiple attempts to educate. Patient was in euphoric mood and 

has been inappropriate with his boundaries towards other especially female 

patients. Patient hence is currently on constant observation. Patient was also 

reporting increase in religiosity. Patient reportedly has been resistant to 

treatment and last received his Invega Sustena on 9/25 or 9/24. Patient 

sleeping has been ok with some interruptions. Patient eating has been fine. 

Patient has been uncooperative with staff. Patient behavior has been 

unpredictable. Patient has been reporting no suicidal or homicidal ideation. 

Patient has paranoid delusion towards others but no psychotic symptoms of 

hallucinations. in the process of treatment over objection as patient is 

adamant about not needing medications and preoccupied with discharge.





Objective





- Appearance


Appearance: Healthy Appearing


Dysmorphic Features: No


Hygiene: Normal


Grooming: Fairly Well Kept





- Behavior


Psychomotor Activities: Normal


Exhibits Abnormal Movement: No





- Attitude and Relatedness


Attitude and Relatedness: Superficially Cooperative


Eye Contact: Fair





- Speech


Quality: Unpressured


Latencies: Normal


Quantity: Terse





- Mood


Patient's Decription of Mood: "Okay"





- Affect


Observed Affect: Labile


Affect Consistent with: Euphoria





- Thought Process


Patient's Thought Process: Goal Directed


Thought Content: Yes Paranoid Ideation, No Passive Death Wish, No Suicidal 

Planning, No Homicidal Ideation





- Sensorium


Experiencing Hallucinations: No, Sensorium is Clear


Type of Hallucinations: Visual: No, Auditory: No, Command: No





- Level of Consciousness


Level of Consciousness: Alert


Orientation: Yes Intact, Yes Orientated to Time, Yes Orientated to Place, Yes 

Orientated to Person





- Impulse Control


Impulse Control: Poor





- Insight and Judgement


Insight and Judgement: Poor





- Medication Management


Medication Management Adherence: No





Assessment





- Assessment


Merits Inpatient Hospitalization: For Immediate Safety, For Stabilization, For 

Discharge Planning


Inpatient DSM-V Dx: F25.0


Clinical Impression: 





This 63-year-old mentally disabled  male known to this facility from 

multiple prior psychiatric hospitalizations, who also suffers from aortic 

stenosis and atrial fibrillation, was brought to the emergency room this time 

once again for appointment and treatment nonadherence even for his ACT team.  

He is extremely tense, irritable, and guarded.





Plan





- Plan


Treatment Plan: 


Name: JYOTI STEEN                        


YOB: 1955                        


[id number]


[id number]








- Patient continues to be hospitalized due to non compliance, unpredictable and 

sexually inappropriate behavior in the community, delusional in his thinking.


- Patient's medications were continued but has been non compliant. Multiple 

attempts were made to educate patient about compliance with his treatment. But 

continued to resist. Patient was continued with current medications and will 

pursue with treatment over objection.


- Patient will be monitored for improvement and side effects. Risk and benefits 

were discussed.


- Patient was encouraged to continue his participation in the milieu, group and 

individual therapy.


Medications: 


 Current Medications





Acetaminophen (Tylenol Tab*)  650 mg PO Q4H PRN


   PRN Reason: PAIN or TEMP > 101 F


   Last Admin: 10/15/18 05:21 Dose:  650 mg


Al Hydrox/Mg Hydrox/Simethicone (Maalox Plus*)  30 ml PO Q4H PRN


   PRN Reason: INDIGESTION


Clonazepam (Klonopin Tab(*))  0.5 mg PO BID [facility]


   Last Admin: 10/16/18 10:52 Dose:  Not Given


Lithium Carbonate (Lithium Carbonate Tab*)  900 mg PO BEDTIME [facility]


   Last Admin: 10/15/18 20:20 Dose:  Not Given


Losartan Potassium (Cozaar Tab*)  25 mg PO QAM [facility]


   Last Admin: 10/16/18 10:52 Dose:  Not Given


Melatonin (Melatonin)  3 mg PO BEDTIME [facility]


   Last Admin: 10/15/18 20:20 Dose:  Not Given


Multivitamins (Theragran Tab*)  1 tab PO DAILY [facility]


   Last Admin: 10/16/18 10:52 Dose:  Not Given


Propranolol HCl (Inderal Tab*)  10 mg PO BID [facility]


   Last Admin: 10/16/18 10:52 Dose:  Not Given


Risperidone (Risperdal*)  3 mg PO BEDTIME [facility]


   Last Admin: 10/15/18 20:21 Dose:  Not Given


Rivaroxaban (Xarelto(*))  20 mg PO QAM [facility]


   Last Admin: 10/16/18 10:52 Dose:  Not Given

## 2018-10-17 RX ADMIN — PROPRANOLOL HYDROCHLORIDE SCH: 10 TABLET ORAL at 09:51

## 2018-10-17 RX ADMIN — CLONAZEPAM SCH: 0.5 TABLET ORAL at 21:37

## 2018-10-17 RX ADMIN — LITHIUM CARBONATE SCH: 300 TABLET ORAL at 21:37

## 2018-10-17 RX ADMIN — RIVAROXABAN SCH: 20 TABLET, FILM COATED ORAL at 09:51

## 2018-10-17 RX ADMIN — LOSARTAN POTASSIUM SCH: 25 TABLET, FILM COATED ORAL at 09:51

## 2018-10-17 RX ADMIN — THERA TABS SCH: TAB at 09:51

## 2018-10-17 RX ADMIN — PROPRANOLOL HYDROCHLORIDE SCH: 10 TABLET ORAL at 21:37

## 2018-10-17 RX ADMIN — RISPERIDONE SCH: 3 TABLET ORAL at 21:37

## 2018-10-17 RX ADMIN — CLONAZEPAM SCH: 0.5 TABLET ORAL at 09:51

## 2018-10-17 NOTE — PN
Subjective





- Subjective


Date of Service: 10/17/18


Service Type: [id number] Hosp care 25 min moderate complexity


Subjective: 





Patient was seen by self, discussed with treatment team, chart was reviewed. 

Patient has been non compliant with his medications. Patient continue to 

rationalizes his not needing any medications now or in the future. Patient has 

no insight in his medical or psychiatric illness and states "I am healthy and I 

take care of myself".  Patient has not been taking his medications despite of 

multiple attempts to educate. Patient continues to be on constant observation 

to help him maintain boundaries. Patient received his Invega Sustena on 9/25 as 

per collateral. Patient sleeping has been ok with some interruptions. Patient 

eating has been fine. Patient has been somewhat cooperative with staff. Patient 

behavior has been unpredictable. Patient has been reporting no suicidal or 

homicidal ideation. Patient has paranoid delusion and trust issues towards 

others but no psychotic symptoms of hallucinations. Patient is in the process 

of treatment over objection as patient is adamant about not needing medications 

and preoccupied with discharge.





Objective





- Appearance


Appearance: Healthy Appearing


Dysmorphic Features: No


Hygiene: Normal


Grooming: Fairly Well Kept





- Behavior


Psychomotor Activities: Normal


Exhibits Abnormal Movement: No





- Attitude and Relatedness


Attitude and Relatedness: Superficially Cooperative


Eye Contact: Poor





- Speech


Quality: Unpressured


Latencies: Normal





- Mood


Patient's Decription of Mood: "Good"





- Affect


Observed Affect: Labile


Affect Consistent with: Euphoria





- Thought Process


Thought Content: Yes Paranoid Ideation - residual trust related paranoia, No 

Passive Death Wish, No Suicidal Planning, No Homicidal Ideation





- Sensorium


Experiencing Hallucinations: No, Sensorium is Clear


Type of Hallucinations: Visual: No, Auditory: No, Command: No





- Level of Consciousness


Level of Consciousness: Alert


Orientation: Yes Intact, Yes Orientated to Time, Yes Orientated to Place, Yes 

Orientated to Person





- Impulse Control


Impulse Control: Poor - but has been fair with constant obervation





- Insight and Judgement


Insight and Judgement: Poor





- Group Participation


Particating in Group Activities: Yes





- Medication Management


Medication Management Adherence: No





Assessment





- Assessment


Merits Inpatient Hospitalization: For Immediate Safety, For Stabilization, For 

Discharge Planning


Inpatient DSM-V Dx: F25.0


Clinical Impression: 





This 63-year-old mentally disabled  male known to this facility from 

multiple prior psychiatric hospitalizations, who also suffers from aortic 

stenosis and atrial fibrillation, was brought to the emergency room this time 

once again for appointment and treatment nonadherence even for his ACT team.  

He is extremely tense, irritable, and guarded.





Plan





- Plan


Treatment Plan: 


Name: JYOTI STEEN                        


YOB: 1955                        


[id number]


[id number]








- Patient continues to be hospitalized due to non compliance, unpredictable and 

sexually inappropriate behavior in the community, delusional in his thinking.


- Patient's medications were continued but has been non compliant. Multiple 

attempts were made to educate patient about compliance with his treatment. But 

continued to resist. Patient was continued with current medications and will 

pursue with treatment over objection.


- Patient next Invega Sustena IM is due 10/23/18. Patient constant observation 

was discussed with team and was changed to CO while awake.


- Patient will be monitored for improvement and side effects. Risk and benefits 

were discussed.


- Patient was encouraged to continue his participation in the milieu, group and 

individual therapy.


Medications: 


 Current Medications





Acetaminophen (Tylenol Tab*)  650 mg PO Q4H PRN


   PRN Reason: PAIN or TEMP > 101 F


   Last Admin: 10/15/18 05:21 Dose:  650 mg


Al Hydrox/Mg Hydrox/Simethicone (Maalox Plus*)  30 ml PO Q4H PRN


   PRN Reason: INDIGESTION


Clonazepam (Klonopin Tab(*))  0.5 mg PO BID [facility]


   Last Admin: 10/17/18 09:51 Dose:  Not Given


Lithium Carbonate (Lithium Carbonate Tab*)  900 mg PO BEDTIME [facility]


   Last Admin: 10/16/18 19:55 Dose:  Not Given


Losartan Potassium (Cozaar Tab*)  25 mg PO QAM [facility]


   Last Admin: 10/17/18 09:51 Dose:  Not Given


Melatonin (Melatonin)  3 mg PO BEDTIME [facility]


   Last Admin: 10/16/18 19:55 Dose:  Not Given


Multivitamins (Theragran Tab*)  1 tab PO DAILY [facility]


   Last Admin: 10/17/18 09:51 Dose:  Not Given


Propranolol HCl (Inderal Tab*)  10 mg PO BID [facility]


   Last Admin: 10/17/18 09:51 Dose:  Not Given


Risperidone (Risperdal*)  3 mg PO BEDTIME [facility]


   Last Admin: 10/16/18 19:55 Dose:  Not Given


Rivaroxaban (Xarelto(*))  20 mg PO QAM [facility]


   Last Admin: 10/17/18 09:51 Dose:  Not Given

## 2018-10-18 RX ADMIN — CLONAZEPAM SCH: 0.5 TABLET ORAL at 08:30

## 2018-10-18 RX ADMIN — THERA TABS SCH: TAB at 08:31

## 2018-10-18 RX ADMIN — LITHIUM CARBONATE SCH: 300 TABLET ORAL at 20:25

## 2018-10-18 RX ADMIN — RIVAROXABAN SCH: 20 TABLET, FILM COATED ORAL at 08:30

## 2018-10-18 RX ADMIN — RISPERIDONE SCH: 3 TABLET ORAL at 20:25

## 2018-10-18 RX ADMIN — LOSARTAN POTASSIUM SCH: 25 TABLET, FILM COATED ORAL at 08:30

## 2018-10-18 RX ADMIN — PROPRANOLOL HYDROCHLORIDE SCH: 10 TABLET ORAL at 08:30

## 2018-10-18 RX ADMIN — PROPRANOLOL HYDROCHLORIDE SCH: 10 TABLET ORAL at 20:25

## 2018-10-18 RX ADMIN — CLONAZEPAM SCH: 0.5 TABLET ORAL at 20:25

## 2018-10-18 NOTE — PN
Subjective





- Subjective


Date of Service: 10/18/18


Service Type: [id number] Hosp care 15 min low complexity


Subjective: 





Patient was seen by self, discussed with treatment team, chart was reviewed. 

Patient has been non compliant with his medications. Patient was angry, 

irritable today, restless and has been pacing constantly on the unit all day, 

disorganized behavior. Patient was loud and aggressive today and unpredictable 

with his behavior. Patient rubbed a visible whitish cream all over her 

extremities and his black trouser. When approached  patient he got aggressive 

and did not cooperate answering any question. Patient has no insight in his 

medical or psychiatric illness.  Patient has not been taking his medications 

despite of multiple attempts to educate. Patient continues to be on constant 

observation to help him maintain boundaries with out patients. Patient received 

his Invega Sustena on 9/25 as per collateral. Patient sleeping has been ok. 

Patient eating has been fine. Patient has been somewhat cooperative with staff. 

Patient behavior has been unpredictable. Patient has been reporting no suicidal 

or homicidal ideation. Patient has paranoid and trust issues towards others and 

stays internally preoccupied appears to be responding to internal stimuli. 

Patient is in the process of treatment over objection as patient is adamant 

about not needing medications and preoccupied with discharge.





Objective





- Appearance


Appearance: Healthy Appearing


Dysmorphic Features: No


Hygiene: Dirty


Grooming: Disheveled





- Behavior


Psychomotor Activities: Normal


Exhibits Abnormal Movement: No





- Attitude and Relatedness


Attitude and Relatedness: Psychotically Related


Eye Contact: Fair





- Speech


Quality: Unpressured


Latencies: Long


Quantity: Terse





- Mood


Patient's Decription of Mood: "Irritable"





- Affect


Observed Affect: Labile


Affect Consistent with: Dysphoria





- Thought Process


Patient's Thought Process: Disorganized


Thought Content: Yes Paranoid Ideation, No Passive Death Wish, No Suicidal 

Planning, No Homicidal Ideation





- Sensorium


Experiencing Hallucinations: No, Sensorium is Clear


Type of Hallucinations: Visual: No, Auditory: No, Command: No





- Level of Consciousness


Level of Consciousness: Agitated


Orientation: Yes Intact, Yes Orientated to Time, Yes Orientated to Place, Yes 

Orientated to Person





- Impulse Control


Impulse Control: Poor





- Insight and Judgement


Insight and Judgement: Poor





- Medication Management


Medication Management Adherence: No





Assessment





- Assessment


Merits Inpatient Hospitalization: For Immediate Safety, For Stabilization, For 

Discharge Planning


Inpatient DSM-V Dx: F25.0


Clinical Impression: 





This 63-year-old mentally disabled  male known to this facility from 

multiple prior psychiatric hospitalizations, who also suffers from aortic 

stenosis and atrial fibrillation, was brought to the emergency room this time 

once again for appointment and treatment nonadherence even for his ACT team.  

He is extremely tense, irritable, and guarded.





Plan





- Plan


Treatment Plan: 


Name: JYOTI STEEN                        


YOB: 1955                        


[id number]


[id number]








- Patient continues to be hospitalized due to non compliance, unpredictable and 

sexually inappropriate behavior in the community, delusional in his thinking.


- Patient's medications were continued but has been non compliant. Multiple 

attempts were made to educate patient about compliance with his treatment. But 

continued to resist. Patient was continued with current medications and will 

pursue with treatment over objection. Court appearance tomorrow for treatment 

over objection.


- Patient next Invega Elliot IM is due 10/23/18. Patient constant observation 

to be continued while awake.


- Patient will be monitored for improvement and side effects. Risk and benefits 

were discussed.


- Patient was encouraged to continue his participation in the milieu, group and 

individual therapy.


Medications: 


 Current Medications





Acetaminophen (Tylenol Tab*)  650 mg PO Q4H PRN


   PRN Reason: PAIN or TEMP > 101 F


   Last Admin: 10/15/18 05:21 Dose:  650 mg


Al Hydrox/Mg Hydrox/Simethicone (Maalox Plus*)  30 ml PO Q4H PRN


   PRN Reason: INDIGESTION


Clonazepam (Klonopin Tab(*))  0.5 mg PO BID [facility]


   Last Admin: 10/18/18 08:30 Dose:  Not Given


Lithium Carbonate (Lithium Carbonate Tab*)  900 mg PO BEDTIME [facility]


   Last Admin: 10/17/18 21:37 Dose:  Not Given


Losartan Potassium (Cozaar Tab*)  25 mg PO QAM [facility]


   Last Admin: 10/18/18 08:30 Dose:  Not Given


Melatonin (Melatonin)  3 mg PO BEDTIME [facility]


   Last Admin: 10/17/18 21:37 Dose:  Not Given


Multivitamins (Theragran Tab*)  1 tab PO DAILY [facility]


   Last Admin: 10/18/18 08:31 Dose:  Not Given


Propranolol HCl (Inderal Tab*)  10 mg PO BID [facility]


   Last Admin: 10/18/18 08:30 Dose:  Not Given


Risperidone (Risperdal*)  3 mg PO BEDTIME [facility]


   Last Admin: 10/17/18 21:37 Dose:  Not Given


Rivaroxaban (Xarelto(*))  20 mg PO QAM [facility]


   Last Admin: 10/18/18 08:30 Dose:  Not Given

## 2018-10-19 RX ADMIN — THERA TABS SCH: TAB at 08:25

## 2018-10-19 RX ADMIN — LOSARTAN POTASSIUM SCH: 25 TABLET, FILM COATED ORAL at 08:25

## 2018-10-19 RX ADMIN — CLONAZEPAM SCH: 0.5 TABLET ORAL at 08:25

## 2018-10-19 RX ADMIN — PROPRANOLOL HYDROCHLORIDE SCH: 10 TABLET ORAL at 08:25

## 2018-10-19 RX ADMIN — RIVAROXABAN SCH: 20 TABLET, FILM COATED ORAL at 08:25

## 2018-10-19 NOTE — PN
Subjective





- Subjective


Date of Service: 10/19/18


Service Type: [id number] Hosp care 35 min high complexity


Subjective: 





Patient was seen by self, discussed with treatment team, chart was reviewed. 

Patient has been non compliant with his medications. Patient was euphoric but 

restless today, irritable today, restless and has been pacing constantly on the 

unit all day, disorganized behavior. Patient reportedly has been struggling 

with his sexual urges and lustful thoughts but has been on constant observation 

to help maintain boundaries. Patient continues to have paranoid delusions about 

water fountain that he feels might be poisoned. Patient continues to have 

paranoid and trust issues toward medical provider and still refuses his 

medications.  Patient has not been taking his medications despite of multiple 

attempts to educate. Patient received his Invega Sustena on 9/25 as per 

collateral. Patient sleeping was disturbed all night yesterday as per staff. 

Patient eating has been fine. Patient has been somewhat superficially 

cooperative with staff. Patient behavior has been unpredictable. Patient has 

been reporting no suicidal or homicidal ideation. Patient has paranoid and 

trust issues towards others and stays internally preoccupied appears to be 

responding to internal stimuli. Patient is in the process of treatment over 

objection as patient is adamant about not needing medications and preoccupied 

with discharge.





Objective





- Appearance


Appearance: Healthy Appearing


Dysmorphic Features: No


Hygiene: Normal


Grooming: Fairly Well Kept





- Behavior


Psychomotor Activities: Normal


Exhibits Abnormal Movement: No





- Attitude and Relatedness


Attitude and Relatedness: Cooperative


Eye Contact: Fair





- Speech


Quality: Unpressured


Latencies: Normal


Quantity: Appropriate





- Mood


Patient's Decription of Mood: and euphoric at times





- Affect


Observed Affect: Labile


Affect Consistent with: Euphoria





- Thought Process


Patient's Thought Process: Goal Directed


Thought Content: Yes Paranoid Ideation, No Passive Death Wish, No Suicidal 

Planning, No Homicidal Ideation





- Sensorium


Experiencing Hallucinations: No, Sensorium is Clear


Type of Hallucinations: Visual: No, Auditory: No, Command: No





- Level of Consciousness


Level of Consciousness: Alert


Orientation: Yes Intact, Yes Orientated to Time, Yes Orientated to Place, Yes 

Orientated to Person





- Impulse Control


Impulse Control: Poor





- Insight and Judgement


Insight and Judgement: Poor





- Medication Management


Medication Management Adherence: No





Assessment





- Assessment


Merits Inpatient Hospitalization: For Immediate Safety, For Stabilization, For 

Discharge Planning


Inpatient DSM-V Dx: F25.0


Clinical Impression: 





This 63-year-old mentally disabled  male known to this facility from 

multiple prior psychiatric hospitalizations, who also suffers from aortic 

stenosis and atrial fibrillation, was brought to the emergency room this time 

once again for appointment and treatment nonadherence even for his ACT team.  

He is extremely tense, irritable, and guarded.





Plan





- Plan


Treatment Plan: 


Name: JYOTI STEEN                        


YOB: 1955                        


[id number]


[id number]








- Patient continues to be hospitalized due to non compliance, unpredictable and 

sexually inappropriate behavior in the community, delusional in his thinking.


- Patient's medications were continued but has been non compliant. Multiple 

attempts were made to educate patient about compliance with his treatment. But 

continued to resist. Patient was continued with current medications and will 

pursue with treatment over objection. Court appearance today for treatment over 

objection.


- Patient next Invega Elliot IM is due 10/23/18. Patient constant observation 

to be continued while awake.


- Patient will be monitored for improvement and side effects. Risk and benefits 

were discussed.


- Patient was encouraged to continue his participation in the milieu, group and 

individual therapy.


Medications: 


 Current Medications





Acetaminophen (Tylenol Tab*)  650 mg PO Q4H PRN


   PRN Reason: PAIN or TEMP > 101 F


   Last Admin: 10/15/18 05:21 Dose:  650 mg


Al Hydrox/Mg Hydrox/Simethicone (Maalox Plus*)  30 ml PO Q4H PRN


   PRN Reason: INDIGESTION


Clonazepam (Klonopin Tab(*))  0.5 mg PO BID [facility]


   Last Admin: 10/19/18 08:25 Dose:  Not Given


Lithium Carbonate (Lithium Carbonate Tab*)  900 mg PO BEDTIME [facility]


   Last Admin: 10/18/18 20:25 Dose:  Not Given


Losartan Potassium (Cozaar Tab*)  25 mg PO QAM [facility]


   Last Admin: 10/19/18 08:25 Dose:  Not Given


Melatonin (Melatonin)  3 mg PO BEDTIME [facility]


   Last Admin: 10/18/18 20:25 Dose:  Not Given


Multivitamins (Theragran Tab*)  1 tab PO DAILY [facility]


   Last Admin: 10/19/18 08:25 Dose:  Not Given


Propranolol HCl (Inderal Tab*)  10 mg PO BID [facility]


   Last Admin: 10/19/18 08:25 Dose:  Not Given


Risperidone (Risperdal*)  3 mg PO BEDTIME [facility]


   Last Admin: 10/18/18 20:25 Dose:  Not Given


Rivaroxaban (Xarelto(*))  20 mg PO QAM [facility]


   Last Admin: 10/19/18 08:25 Dose:  Not Given

## 2018-10-20 RX ADMIN — PROPRANOLOL HYDROCHLORIDE SCH: 10 TABLET ORAL at 19:37

## 2018-10-20 RX ADMIN — DIVALPROEX SODIUM SCH: 500 TABLET, FILM COATED, EXTENDED RELEASE ORAL at 20:40

## 2018-10-20 RX ADMIN — PROPRANOLOL HYDROCHLORIDE SCH: 10 TABLET ORAL at 20:40

## 2018-10-20 RX ADMIN — RIVAROXABAN SCH: 20 TABLET, FILM COATED ORAL at 15:56

## 2018-10-20 RX ADMIN — LORAZEPAM SCH: 1 TABLET ORAL at 20:40

## 2018-10-20 RX ADMIN — LORAZEPAM SCH: 1 TABLET ORAL at 15:57

## 2018-10-20 RX ADMIN — LORAZEPAM SCH: 1 TABLET ORAL at 19:37

## 2018-10-20 RX ADMIN — DIVALPROEX SODIUM SCH: 500 TABLET, FILM COATED, EXTENDED RELEASE ORAL at 19:37

## 2018-10-20 RX ADMIN — PROPRANOLOL HYDROCHLORIDE SCH: 10 TABLET ORAL at 15:56

## 2018-10-21 RX ADMIN — RIVAROXABAN SCH MG: 20 TABLET, FILM COATED ORAL at 16:03

## 2018-10-21 RX ADMIN — PROPRANOLOL HYDROCHLORIDE SCH: 10 TABLET ORAL at 20:51

## 2018-10-21 RX ADMIN — DIVALPROEX SODIUM SCH: 500 TABLET, FILM COATED, EXTENDED RELEASE ORAL at 20:50

## 2018-10-21 RX ADMIN — RIVAROXABAN SCH: 20 TABLET, FILM COATED ORAL at 09:40

## 2018-10-21 RX ADMIN — LORAZEPAM SCH: 1 TABLET ORAL at 09:39

## 2018-10-21 RX ADMIN — PROPRANOLOL HYDROCHLORIDE SCH: 10 TABLET ORAL at 09:40

## 2018-10-21 RX ADMIN — LORAZEPAM SCH: 1 TABLET ORAL at 20:51

## 2018-10-22 RX ADMIN — DIVALPROEX SODIUM SCH: 500 TABLET, FILM COATED, EXTENDED RELEASE ORAL at 21:10

## 2018-10-22 RX ADMIN — PROPRANOLOL HYDROCHLORIDE SCH: 10 TABLET ORAL at 21:11

## 2018-10-22 RX ADMIN — DIVALPROEX SODIUM SCH: 500 TABLET, FILM COATED, EXTENDED RELEASE ORAL at 15:33

## 2018-10-22 RX ADMIN — RIVAROXABAN SCH: 20 TABLET, FILM COATED ORAL at 10:02

## 2018-10-22 RX ADMIN — PROPRANOLOL HYDROCHLORIDE SCH: 10 TABLET ORAL at 10:02

## 2018-10-22 RX ADMIN — LORAZEPAM SCH: 1 TABLET ORAL at 10:02

## 2018-10-22 NOTE — PN
Subjective





- Subjective


Date of Service: 10/22/18


Service Type: [id number] Hosp care 15 min low complexity


Subjective: 





Patient was seen by self, discussed with treatment team, chart was reviewed. 

Patient has been non compliant with his medications. Patient continues to 

rationalizes his not needing any medications now or in the future. Patient 

insight has been improving in his medical or psychiatric illness and has been 

taking Xarelto PO.  Patient has not been taking his other medications despite 

of multiple attempts to educate. Patient continues to be on constant 

observation to help him maintain boundaries. Patient received his Invega 

Sustena on 9/25 as per collateral. Patient sleeping has been disturbed with 

interruptions. Patient eating has been fine. Patient has been somewhat 

cooperative with staff. Patient behavior has been unpredictable. Patient has 

been reporting no suicidal or homicidal ideation. Patient has continued 

paranoid delusion and trust issues towards others but but some improvement in 

intensity today no psychotic symptoms of hallucinations.





Objective





- Appearance


Appearance: Healthy Appearing


Dysmorphic Features: No


Hygiene: Normal


Grooming: Fairly Well Kept





- Behavior


Psychomotor Activities: Normal


Exhibits Abnormal Movement: No





- Attitude and Relatedness


Attitude and Relatedness: Cooperative


Eye Contact: Fair





- Speech


Quality: Unpressured


Latencies: Normal


Quantity: Appropriate





- Mood


Patient's Decription of Mood: "Great"





- Affect


Observed Affect: Euphoric


Affect Consistent with: Euphoria





- Thought Process


Patient's Thought Process: Circumstantial


Thought Content: Yes Paranoid Ideation, No Passive Death Wish, No Suicidal 

Planning, No Homicidal Ideation





- Sensorium


Experiencing Hallucinations: No, Sensorium is Clear


Type of Hallucinations: Visual: No, Auditory: No, Command: No





- Level of Consciousness


Level of Consciousness: Alert


Orientation: Yes Intact, Yes Orientated to Time, Yes Orientated to Place, Yes 

Orientated to Person





- Impulse Control


Impulse Control: Poor - but improving





- Insight and Judgement


Insight and Judgement: Poor - but improving





- Medication Management


Medication Management Adherence: Partial





Assessment





- Assessment


Merits Inpatient Hospitalization: For Immediate Safety, For Stabilization, For 

Discharge Planning


Inpatient DSM-V Dx: F25.0


Clinical Impression: 





This 63-year-old mentally disabled  male known to this facility from 

multiple prior psychiatric hospitalizations, who also suffers from aortic 

stenosis and atrial fibrillation, was brought to the emergency room this time 

once again for appointment and treatment nonadherence even for his ACT team.  

He is extremely tense, irritable, and guarded.





Plan





- Plan


Treatment Plan: 


Name: JYOTI STEEN                        


YOB: 1955                        


[id number]


[id number]








- Patient continues to be hospitalized due to non compliance, unpredictable and 

sexually inappropriate behavior in the community, delusional in his thinking.


- Patient's medications were continued but has been partially compliant. 

Patient was ecouraged to comply with his medications. Patient was offered 

Depakote to help with recurrent sexual thoughts, mood instability and sleep 

disturbance. Patient will be given Ativan PO if refuse PO Depakote and if 

refuse PO Ativan to be given Ativan IM.


- Patient next Invega Sustena IM is due 10/23/18 but will repeat EKG as last 

Qtc was 480. Patient constant observation to be continued while awake.


- Patient will be monitored for improvement and side effects. Risk and benefits 

were discussed.


- Patient was encouraged to continue his participation in the milieu, group and 

individual therapy.


Medications: 


 Current Medications





Acetaminophen (Tylenol Tab*)  650 mg PO Q4H PRN


   PRN Reason: PAIN or TEMP > 101 F


   Last Admin: 10/15/18 05:21 Dose:  650 mg


Al Hydrox/Mg Hydrox/Simethicone (Maalox Plus*)  30 ml PO Q4H PRN


   PRN Reason: INDIGESTION


Divalproex Sodium (Depakote Er Tab(*))  1,000 mg PO BEDTIME [facility]


   Last Admin: 10/21/18 20:50 Dose:  Not Given


Lorazepam (Ativan Inj*)  2 mg IM BEDTIME PRN


   PRN Reason: IF REFUSES PO DEPAKOTE


Lorazepam (Ativan Tab(*))  2 mg PO BID [facility]


Propranolol HCl (Inderal Tab*)  10 mg PO BID [facility]


   Last Admin: 10/22/18 10:02 Dose:  Not Given


Rivaroxaban (Xarelto(*))  20 mg PO QAM [facility]


   Last Admin: 10/22/18 10:02 Dose:  Not Given

## 2018-10-23 RX ADMIN — ALUMINUM HYDROXIDE, MAGNESIUM HYDROXIDE, AND SIMETHICONE PRN ML: 200; 200; 20 SUSPENSION ORAL at 02:30

## 2018-10-23 RX ADMIN — RIVAROXABAN SCH MG: 20 TABLET, FILM COATED ORAL at 05:30

## 2018-10-23 RX ADMIN — RIVAROXABAN SCH: 20 TABLET, FILM COATED ORAL at 08:24

## 2018-10-23 RX ADMIN — PROPRANOLOL HYDROCHLORIDE SCH: 10 TABLET ORAL at 08:24

## 2018-10-23 RX ADMIN — PROPRANOLOL HYDROCHLORIDE SCH: 10 TABLET ORAL at 21:06

## 2018-10-23 NOTE — PN
Subjective





- Subjective


Date of Service: 10/23/18


Service Type: [id number] Hosp care 15 min low complexity


Subjective: 





Patient continues to be struggling with mood instability, sexual thoughts and 

desires. Patient has been actively exercising at times, walking on the unit and 

was able to attend some groups. Patient did not take his po Depakote last night 

and had to be given Ativan IM but today did accept to take liquid form of 

valproic acid at bedtime. will continue to monitor improvement and side 

effects. Patient last EKG has increase QTc will repeat before starting 

antipsychotic.





Objective





- Appearance


Appearance: Healthy Appearing


Hygiene: Normal


Grooming: Fairly Well Kept





- Behavior


Psychomotor Activities: Abnormal-Increased


Exhibits Abnormal Movement: No





- Attitude and Relatedness


Attitude and Relatedness: Superficially Cooperative


Eye Contact: Fair





- Speech


Quality: Unpressured


Latencies: Normal


Quantity: Terse





- Mood


Patient's Decription of Mood: "Anxious"





- Affect


Observed Affect: Labile


Affect Consistent with: Dysphoria





- Thought Process


Patient's Thought Process: Goal Directed


Thought Content: Yes Paranoid Ideation, No Passive Death Wish, No Suicidal 

Planning, No Homicidal Ideation





- Sensorium


Experiencing Hallucinations: No, Sensorium is Clear


Type of Hallucinations: Visual: No, Auditory: No, Command: No





- Level of Consciousness


Level of Consciousness: Alert


Orientation: Yes Intact, Yes Orientated to Time, Yes Orientated to Place, Yes 

Orientated to Person





- Insight and Judgement


Insight and Judgement: Poor





- Group Participation


Particating in Group Activities: Yes





- Medication Management


Medication Management Adherence: Partial





Assessment





- Assessment


Merits Inpatient Hospitalization: For Immediate Safety, For Stabilization, For 

Discharge Planning


Inpatient DSM-V Dx: F25.0


Clinical Impression: 





This 63-year-old mentally disabled  male known to this facility from 

multiple prior psychiatric hospitalizations, who also suffers from aortic 

stenosis and atrial fibrillation, was brought to the emergency room this time 

once again for appointment and treatment nonadherence even for his ACT team.  

He is extremely tense, irritable, and guarded.





Plan





- Plan


Treatment Plan: 


Name: JYOTI STEEN                        


YOB: 1955                        


[id number]


[id number]








- Patient continues to be hospitalized due to non compliance, unpredictable and 

sexually inappropriate behavior in the community, delusional in his thinking.


- Patient's medications were continued but has been partially compliant. 

Patient was encouraged to comply with his medications. Patient agreed to take 

Depakene today to help with recurrent sexual thoughts, mood instability and 

sleep disturbance. Patient will be given Ativan PO if refuse PO Depakene and if 

refuse PO Ativan to be given Ativan IM.


- Patient next Invega Elliot IM is due 10/23/18 but will repeat EKG as last 

Qtc was 480. Patient constant observation to be continued while awake.


- Patient will be monitored for improvement and side effects. Risk and benefits 

were discussed.


- Patient was encouraged to continue his participation in the milieu, group and 

individual therapy.


Medications: 


 Current Medications





Acetaminophen (Tylenol Tab*)  650 mg PO Q4H PRN


   PRN Reason: PAIN or TEMP > 101 F


   Last Admin: 10/15/18 05:21 Dose:  650 mg


Al Hydrox/Mg Hydrox/Simethicone (Maalox Plus*)  30 ml PO Q4H PRN


   PRN Reason: INDIGESTION


   Last Admin: 10/23/18 02:30 Dose:  30 ml


Lorazepam (Ativan Inj*)  2 mg IM BEDTIME PRN


   PRN Reason: IF REFUSES PO Ativan


   Last Admin: 10/22/18 15:35 Dose:  2 mg


Lorazepam (Ativan Tab(*))  2 mg PO BEDTIME PRN


   PRN Reason: if refuses PO Depakene


Propranolol HCl (Inderal Tab*)  10 mg PO BID [facility]


   Last Admin: 10/23/18 08:24 Dose:  Not Given


[facility]to(*))  [facility] [place]


   Last Admin: 10/23/18 08:24 Dose:  Not Given


Valproic Acid (Depakene Liq(*))  500 mg PO BEDTIME [facility]

## 2018-10-24 RX ADMIN — RIVAROXABAN SCH MG: 20 TABLET, FILM COATED ORAL at 08:12

## 2018-10-24 RX ADMIN — ACETAMINOPHEN PRN MG: 325 TABLET ORAL at 14:28

## 2018-10-24 RX ADMIN — METOPROLOL SUCCINATE SCH: 25 TABLET, EXTENDED RELEASE ORAL at 20:52

## 2018-10-24 RX ADMIN — VALPROIC ACID SCH MG: 250 SOLUTION ORAL at 20:50

## 2018-10-24 RX ADMIN — PROPRANOLOL HYDROCHLORIDE SCH: 10 TABLET ORAL at 08:12

## 2018-10-24 NOTE — PN
Subjective





- Subjective


Date of Service: 10/24/18


Service Type: [id number] Hosp care 15 min low complexity


Subjective: 





Patient continues to be unpredictable with his behavior, paranoid, partially 

compliant with his medications. Patient is still ambivalent about taking his 

medications and wants to have "holistic" approach towards it. Patient 

reportedly has been having nose bleeds and likely due to picky which left 

crusted blood on his upper lip. Patient took his Depakene last night and 

tolerated it well. Patient continues to have increase pulse rate and did not 

cooperate with EKG yesterday. Hospitalist consult was called to assess need for 

Xarelto and if any urgency in controlling pulse rate. Patient last Qtc was 480 

and is now due for his next Invega Sustena, will hold for now until 

recommendations from Hospitalist or Cardiologist.  





Objective





- Appearance


Appearance: Healthy Appearing - protruded belly


Dysmorphic Features: No


Hygiene: Normal


Grooming: Fairly Well Kept





- Behavior


Psychomotor Activities: Abnormal-Increased


Exhibits Abnormal Movement: No





- Attitude and Relatedness


Attitude and Relatedness: Superficially Cooperative


Eye Contact: Poor





- Speech


Quality: Unpressured


Latencies: Normal


Quantity: Terse





- Mood


Patient's Decription of Mood: "Angry"





- Affect


Observed Affect: Labile


Affect Consistent with: Euphoria





- Thought Process


Patient's Thought Process: Goal Directed


Thought Content: Yes Paranoid Ideation, No Passive Death Wish, No Suicidal 

Planning, No Homicidal Ideation





- Sensorium


Experiencing Hallucinations: No, Sensorium is Clear


Type of Hallucinations: Visual: No, Auditory: No, Command: No





- Level of Consciousness


Level of Consciousness: Alert


Orientation: Yes Intact, Yes Orientated to Time, Yes Orientated to Place, Yes 

Orientated to Person





- Impulse Control


Impulse Control: Intact





- Insight and Judgement


Insight and Judgement: Poor





- Medication Management


Medication Management Adherence: Partial





Assessment





- Assessment


Merits Inpatient Hospitalization: For Immediate Safety, For Stabilization, For 

Discharge Planning


Inpatient DSM-V Dx: F25.0


Clinical Impression: 





This 63-year-old mentally disabled  male known to this facility from 

multiple prior psychiatric hospitalizations, who also suffers from aortic 

stenosis and atrial fibrillation, was brought to the emergency room this time 

once again for appointment and treatment nonadherence even for his ACT team.  

He is extremely tense, irritable, and guarded.





Plan





- Plan


Treatment Plan: 


Name: JYOTI STEEN                        


YOB: 1955                        


[id number]


[id number]








- Patient continues to be hospitalized due to non compliance, unpredictable and 

sexually inappropriate behavior in the community, delusional in his thinking.


- Patient's medications were continued but has been partially compliant. 

Patient was encouraged to comply with his medications. Patient Depakene was 

increased to 750 mg at Bedtime to help with recurrent sexual thoughts, mood 

instability and sleep disturbance. Patient will be given Ativan PO if refuse PO 

Depakene and if refuse PO Ativan to be given Ativan IM. Hospitalist consult was 

called.


- Patient next Invega Sustena IM is past due date10/23/18 as last Qtc was 480, 

which was 500 before that. Patient constant observation to be continued while 

awake and out of his room.


- Patient will be monitored for improvement and side effects. Risk and benefits 

were discussed.


- Patient was encouraged to continue his participation in the milieu, group and 

individual therapy.


Medications: 


 Current Medications





Acetaminophen (Tylenol Tab*)  650 mg PO Q4H PRN


   PRN Reason: PAIN or TEMP > 101 F


   Last Admin: 10/15/18 05:21 Dose:  650 mg


Al Hydrox/Mg Hydrox/Simethicone (Maalox Plus*)  30 ml PO Q4H PRN


   PRN Reason: INDIGESTION


   Last Admin: 10/23/18 02:30 Dose:  30 ml


Lorazepam (Ativan Inj*)  2 mg IM BEDTIME PRN


   PRN Reason: IF REFUSES PO Ativan


   Last Admin: 10/22/18 15:35 Dose:  2 mg


Lorazepam (Ativan Tab(*))  2 mg PO BEDTIME PRN


   PRN Reason: if refuses PO Depakene


Propranolol HCl (Inderal Tab*)  10 mg PO BID [facility]


   Last Admin: 10/24/18 08:12 Dose:  Not Given


Rivaroxaban (Xarelto(*))  20 mg PO QAM [facility]


   Last Admin: 10/24/18 08:12 Dose:  20 mg


Valproic Acid (Depakene Liq(*))  750 mg PO BEDTIME [facility]

## 2018-10-24 NOTE — PN
Hospitalist Progress Note


Date of Service: 10/24/18





Consultation requested by Psychiatrist in [facility]. Patient has history of atrial 

fibrillation and prolonged QT, however, he is only sporadically taking his 

xarelto for anticoagulation. Patient is on propranolol for essential tremor but 

apparently refuses any other cardiac medications for rate control. Per the 

records, patient is refusing most medication and is due for his next dose of IM 

Invega. When I introduced myself and explained that I am part of the medicine 

team, patient abruptly sat up and stated "Not interested" and walked away. 

Staff tried to talk with him again but he continued to refuse to be seen by my 

to discuss his cardiac meds. Given the patient's mood, I am not able to 

physically evaluate the patient or obtain ROS. 





I discussed the situation with Dr. Urias, and explained that there are no IM 

options for rate control for his atrial fib. Also it is riskier to only take 

the xarelto sporadically. Per Dr. Urias, the patient is willing to take "one 

cardiac med", as such, I would recommend toprol XL 25mg PO daily for better 

rate control and discontinue propranolol and xarelto. The patient's current 

condition is not life threatening at this time and he appears clinically stable

, however, if he should decompensate, the only option would be to  restrain him 

and insert a nasogastric tube to administer oral medications or insert an IV 

for parenteral medications if needed. 





In terms of his prolonged QTc and need for antipsychotics, I would recommend 

another EKG to re-evaluate the QT interval, if it is trending down (which it 

seems to be from the last two EKGs he has allowed) to <450, this would allow 

the staff to administer antipsychotics. Of all the antipsychotics, olanzapine 

may be a good option in terms of QTc interval, which on average is 2-6.5 

milliseconds and is shorter acting. Paliperidone ER on average will increase 

the QTc approximately 2-4 milliseconds in it's oral form, however I need to 

further research of the IM injectable form has increased potential. Either in 

combination with risperidone and lithium will increase QTc. Aripiprazole may 

actually shorten the QTc which may also be an option.





For now, I will order the toprol and discontinue the propranolol. The patient's 

plan of care will depend on his willingness to take oral medications.





Thank you for involving us in your patient's care. Please reconsult as needed.

## 2018-10-25 RX ADMIN — LORAZEPAM SCH MG: 1 TABLET ORAL at 12:44

## 2018-10-25 RX ADMIN — METOPROLOL SUCCINATE SCH: 25 TABLET, EXTENDED RELEASE ORAL at 10:50

## 2018-10-25 RX ADMIN — ARIPIPRAZOLE SCH MG: 5 TABLET ORAL at 12:44

## 2018-10-25 RX ADMIN — VALPROIC ACID SCH MG: 250 SOLUTION ORAL at 19:49

## 2018-10-25 NOTE — PN
Subjective





- Subjective


Date of Service: 10/25/18


Service Type: [id number] Hosp care 25 min moderate complexity


Subjective: 





Patient continues to be unpredictable with his behavior, paranoid, partially 

compliant with his medications. Patient was uncooperative with interview this 

morning but has been insisting on discontinuing his CO. Patient wish was 

fulfilled but continues to resist PO medications. Patient was tried to educate 

about discussion and notes reviewed of hospitalist. Patient shows no interest. 

Patient's Xarelto was discontinued due to long term compliance issues with 

patient. Patietn Qtc was trending down but has been resisting to obtain another 

Qtc. Patient Invanuel Sustena is on hold for. But will start antipsychotic least 

likely to alter QTc. Patient is still ambivalent about taking his medications. 

Patient took his Depakene last night and tolerated it well but continue to have 

unstable mood. Patient continues to have increase pulse rate and did not 

cooperate with EKG yesterday, will try to obtain it today again.   





Objective





- Appearance


Appearance: Healthy Appearing - not in acute phsycial distress


Dysmorphic Features: No


Hygiene: Dirty


Grooming: Disheveled





- Behavior


Psychomotor Activities: Normal


Exhibits Abnormal Movement: No





- Attitude and Relatedness


Attitude and Relatedness: Psychotically Related


Eye Contact: Poor





- Speech


Quality: Unpressured


Latencies: Normal


Quantity: Terse





- Mood


Patient's Decription of Mood: "Angry"





- Affect


Observed Affect: Labile


Affect Consistent with: Dysphoria - and irritable





- Thought Process


Patient's Thought Process: Goal Directed


Thought Content: Yes Paranoid Ideation, No Passive Death Wish, No Suicidal 

Planning, No Homicidal Ideation





- Sensorium


Experiencing Hallucinations: No, Sensorium is Clear


Type of Hallucinations: Visual: No, Auditory: No, Command: No





- Level of Consciousness


Level of Consciousness: Agitated


Orientation: Yes Intact, Yes Orientated to Time, Yes Orientated to Place, Yes 

Orientated to Person





- Impulse Control


Impulse Control: Intact





- Insight and Judgement


Insight and Judgement: Poor





- Medication Management


Medication Management Adherence: Partial





Assessment





- Assessment


Merits Inpatient Hospitalization: For Immediate Safety, For Stabilization, For 

Discharge Planning


Inpatient DSM-V Dx: F25.0


Clinical Impression: 





This 63-year-old mentally disabled  male known to this facility from 

multiple prior psychiatric hospitalizations, who also suffers from aortic 

stenosis and atrial fibrillation, was brought to the emergency room this time 

once again for appointment and treatment nonadherence even for his ACT team.  

He is extremely tense, irritable, and guarded.





Plan





- Plan


Treatment Plan: 


Name: JYOTI STEEN                        


YOB: 1955                        


[id number]


[id number]








- Patient continues to be hospitalized due to non compliance, unpredictable and 

sexually inappropriate behavior in the community, delusional in his thinking.


- Patient's medications were continued but has been partially compliant. 

Patient was encouraged to comply with his medications. Patient Depakene was 

continued at 750 mg at Bedtime to help with recurrent sexual thoughts, mood 

instability and sleep disturbance. Patient will be given Ativan PO if refuse PO 

Depakene and if refuse PO Ativan to be given Ativan IM. Hospitalist consult was 

reviewed and discussed with hospitalist.


- Patient next Invega Sustena IM is past due date10/23/18 as last Qtc was 480, 

which was 500 before that. Patient was started on Abilify 10 mg PO daily as it 

least effects QTc. Patient constant observation was discontinued, will continue 

to monitor its need. Also EKG to be tried again today.


- Patient will be monitored for improvement and side effects. Risk and benefits 

were discussed.


- Patient was encouraged to continue his participation in the milieu, group and 

individual therapy.


Medications: 


 Current Medications





Acetaminophen (Tylenol Tab*)  650 mg PO Q4H PRN


   PRN Reason: PAIN or TEMP > 101 F


   Last Admin: 10/24/18 14:28 Dose:  650 mg


Al Hydrox/Mg Hydrox/Simethicone (Maalox Plus*)  30 ml PO Q4H PRN


   PRN Reason: INDIGESTION


   Last Admin: 10/23/18 02:30 Dose:  30 ml


Aripiprazole (Abilify  Tab*)  10 mg PO DAILY [facility]


Lorazepam (Ativan Inj*)  2 mg IM BEDTIME PRN


   PRN Reason: IF REFUSES PO Ativan


   Last Admin: 10/22/18 15:35 Dose:  2 mg


Lorazepam (Ativan Tab(*))  2 mg PO BEDTIME PRN


   PRN Reason: if refuses PO Depakene


Lorazepam (Ativan Tab(*))  1 mg PO DAILY [facility]


Metoprolol Succinate (Toprol Xl Tab*)  25 mg PO DAILY [facility]


   Last Admin: 10/25/18 10:50 Dose:  Not Given


Valproic Acid (Depakene Liq(*))  750 mg PO BEDTIME [facility]


   Last Admin: 10/24/18 20:50 Dose:  750 mg

## 2018-10-26 RX ADMIN — METOPROLOL SUCCINATE SCH MG: 25 TABLET, EXTENDED RELEASE ORAL at 08:04

## 2018-10-26 RX ADMIN — LORAZEPAM SCH MG: 1 TABLET ORAL at 08:03

## 2018-10-26 RX ADMIN — CLONAZEPAM SCH: 0.5 TABLET ORAL at 19:40

## 2018-10-26 RX ADMIN — ARIPIPRAZOLE SCH MG: 5 TABLET ORAL at 08:03

## 2018-10-26 RX ADMIN — VALPROIC ACID SCH MG: 250 SOLUTION ORAL at 19:43

## 2018-10-26 NOTE — PN
Subjective





- Subjective


Date of Service: 10/26/18


Service Type: [id number] Hosp care 15 min low complexity


Subjective: 





Patient continues to be unpredictable with his behavior, paranoid, partially 

compliant with his medications. Patient was uncooperative with interview this 

morning but has been insisting on discontinuing his CO. Patient wish was 

fulfilled but continues to resist PO medications. Patient was tried to educate 

about discussion and notes reviewed of hospitalist. Patient shows no interest. 

Patient's Xarelto was discontinued due to long term compliance issues with 

patient. Patietn Qtc was trending down but has been resisting to obtain another 

Qtc. Patient Invanuel Sustena is on hold for. But will start antipsychotic least 

likely to alter QTc. Patient is still ambivalent about taking his medications. 

Patient took his Depakene last night and tolerated it well but continue to have 

unstable mood. Patient continues to have increase pulse rate and did not 

cooperate with EKG yesterday, will try to obtain it today again.   





Assessment





- Assessment


Inpatient DSM-V Dx: F25.0


Clinical Impression: 





This 63-year-old mentally disabled  male known to this facility from 

multiple prior psychiatric hospitalizations, who also suffers from aortic 

stenosis and atrial fibrillation, was brought to the emergency room this time 

once again for appointment and treatment nonadherence even for his ACT team.  

He is extremely tense, irritable, and guarded.





Plan





- Plan


Treatment Plan: 


Name: JYOTI STEEN                        


YOB: 1955                        


[id number]


[id number]








- Patient continues to be hospitalized due to non compliance, unpredictable and 

sexually inappropriate behavior in the community, delusional in his thinking.


- Patient's medications were continued but has been partially compliant. 

Patient was encouraged to comply with his medications. Patient Depakene was 

continued at 750 mg at Bedtime to help with recurrent sexual thoughts, mood 

instability and sleep disturbance. Patient will be given Ativan PO if refuse PO 

Depakene and if refuse PO Ativan to be given Ativan IM. Hospitalist consult was 

reviewed and discussed with hospitalist.


- Patient next Invanuel Sustena IM is past due date10/23/18 as last Qtc was 480, 

which was 500 before that. Patient was started on Abilify 10 mg PO daily as it 

least effects QTc. Patient constant observation was discontinued, will continue 

to monitor its need. Also EKG to be tried again today.


- Patient will be monitored for improvement and side effects. Risk and benefits 

were discussed.


- Patient was encouraged to continue his participation in the milieu, group and 

individual therapy.


Medications: 


 Current Medications





Acetaminophen (Tylenol Tab*)  650 mg PO Q4H PRN


   PRN Reason: PAIN or TEMP > 101 F


   Last Admin: 10/24/18 14:28 Dose:  650 mg


Al Hydrox/Mg Hydrox/Simethicone (Maalox Plus*)  30 ml PO Q4H PRN


   PRN Reason: INDIGESTION


   Last Admin: 10/23/18 02:30 Dose:  30 ml


Aripiprazole (Abilify  Tab*)  10 mg PO DAILY [facility]


   Last Admin: 10/26/18 08:03 Dose:  10 mg


Clonazepam (Klonopin Tab(*))  0.5 mg PO BID [facility]


Lorazepam (Ativan Inj*)  2 mg IM BID PRN


   PRN Reason: if refuse po klonopin/depakene


Metoprolol Succinate (Toprol Xl Tab*)  25 mg PO DAILY [facility]


   Last Admin: 10/26/18 08:04 Dose:  25 mg


Valproic Acid (Depakene Liq(*))  750 mg PO BEDTIME [facility]


   Last Admin: 10/25/18 19:49 Dose:  750 mg

## 2018-10-26 NOTE — PN
Subjective





- Subjective


Date of Service: 10/26/18


Service Type: [id number] Hosp care 15 min low complexity


Subjective: 





Patient was in somewhat irritable mood but better than yesterday. Patient has 

been taking his medications to avoid getting IM and still has poor insight. 

Patient was less paranoid today but continues to be unpredictable with his 

behavior. Patient has been found pacing at times but less than before and has 

been able to manage boundaries with out requiring constant observation. Patient 

was again tried to educate about discussion and notes reviewed of hospitalist 

and need for EKG. Patient shows no interest. Patient's Xarelto was discontinued 

due to long term compliance issues with patient. Patietn Qtc was trending down 

but has been resisting to obtain another EKG. Patient Invega Sustena is on hold 

for. But will start antipsychotic least likely to alter QTc. Patient is still 

ambivalent about taking his medications. Patient took his Depakene last night 

and tolerated it well and shows some improvement in mood and is resistant to 

increment. Patient continues to have increase pulse rate and did not cooperate 

with EKG yesterday, will try to obtain it today again.   





Objective





- Appearance


Appearance: Healthy Appearing


Dysmorphic Features: No


Hygiene: Normal


Grooming: Fairly Well Kept





- Behavior


Psychomotor Activities: Normal


Exhibits Abnormal Movement: No





- Attitude and Relatedness


Attitude and Relatedness: Superficially Cooperative





- Speech


Quality: Unpressured


Latencies: Normal


Quantity: Terse





- Mood


Patient's Decription of Mood: "Angry"





- Affect


Observed Affect: Tense


Affect Consistent with: Dysphoria





- Thought Process


Patient's Thought Process: Goal Directed


Thought Content: Yes Paranoid Ideation - less intense, No Passive Death Wish, 

No Suicidal Planning, No Homicidal Ideation





- Sensorium


Experiencing Hallucinations: No, Sensorium is Clear


Type of Hallucinations: Visual: No, Auditory: No, Command: No





- Level of Consciousness


Level of Consciousness: Alert


Orientation: Yes Intact, Yes Orientated to Time, Yes Orientated to Place, Yes 

Orientated to Person





- Impulse Control


Impulse Control: Poor - as per recent but showing fair improvement





- Insight and Judgement


Insight and Judgement: Poor





- Medication Management


Medication Management Adherence: Yes





Assessment





- Assessment


Merits Inpatient Hospitalization: For Immediate Safety, For Stabilization, For 

Discharge Planning


Inpatient DSM-V Dx: F25.0


Clinical Impression: 





This 63-year-old mentally disabled  male known to this facility from 

multiple prior psychiatric hospitalizations, who also suffers from aortic 

stenosis and atrial fibrillation, was brought to the emergency room this time 

once again for appointment and treatment nonadherence even for his ACT team.  

He is extremely tense, irritable, and guarded.





Plan





- Plan


Treatment Plan: 


Name: JYOTI STEEN                        


YOB: 1955                        


[id number]


[id number]








- Patient continues to be hospitalized due to non compliance, unpredictable and 

sexually inappropriate behavior in the community, delusional in his thinking.


- Patient's medications were continued but has been partially compliant. 

Patient was encouraged to comply with his medications. Patient Depakene was 

continued at 750 mg at Bedtime to help with recurrent sexual thoughts, mood 

instability and sleep disturbance. Patient also started on Clonazepam 0.5 mg 

BID to help with anxity, pacing and racing thoughts and if refuse PO to be 

given Ativan IM. 


- Patient next Invega Sustena IM is past due date10/23/18 as last Qtc was 480, 

which was 500 before that. Patient was started on Abilify 10 mg PO daily as it 

least effects QTc. Patient constant observation was discontinued, will continue 

to monitor its need. Also EKG to be tried again today.


- Patient will be monitored for improvement and side effects. Risk and benefits 

were discussed.


- Patient was encouraged to continue his participation in the milieu, group and 

individual therapy.


Medications: 


 Current Medications





Acetaminophen (Tylenol Tab*)  650 mg PO Q4H PRN


   PRN Reason: PAIN or TEMP > 101 F


   Last Admin: 10/24/18 14:28 Dose:  650 mg


Al Hydrox/Mg Hydrox/Simethicone (Maalox Plus*)  30 ml PO Q4H PRN


   PRN Reason: INDIGESTION


   Last Admin: 10/23/18 02:30 Dose:  30 ml


Aripiprazole (Abilify  Tab*)  10 mg PO DAILY [facility]


   Last Admin: 10/26/18 08:03 Dose:  10 mg


Clonazepam (Klonopin Tab(*))  0.5 mg PO BID [facility]


Lorazepam (Ativan Inj*)  2 mg IM BID PRN


   PRN Reason: if refuse po klonopin/depakene


Metoprolol Succinate (Toprol Xl Tab*)  25 mg PO DAILY [facility]


   Last Admin: 10/26/18 08:04 Dose:  25 mg


Valproic Acid (Depakene Liq(*))  750 mg PO BEDTIME [facility]


   Last Admin: 10/25/18 19:49 Dose:  750 mg

## 2018-10-27 RX ADMIN — ARIPIPRAZOLE SCH MG: 5 TABLET ORAL at 08:07

## 2018-10-27 RX ADMIN — VALPROIC ACID SCH MG: 250 SOLUTION ORAL at 20:08

## 2018-10-27 RX ADMIN — CLONAZEPAM SCH MG: 0.5 TABLET ORAL at 20:11

## 2018-10-27 RX ADMIN — ARIPIPRAZOLE SCH MG: 5 TABLET ORAL at 08:25

## 2018-10-27 RX ADMIN — METOPROLOL SUCCINATE SCH MG: 25 TABLET, EXTENDED RELEASE ORAL at 08:07

## 2018-10-27 RX ADMIN — METOPROLOL SUCCINATE SCH MG: 25 TABLET, EXTENDED RELEASE ORAL at 08:25

## 2018-10-27 RX ADMIN — CLONAZEPAM SCH: 0.5 TABLET ORAL at 09:55

## 2018-10-27 RX ADMIN — CLONAZEPAM SCH MG: 0.5 TABLET ORAL at 08:08

## 2018-10-27 RX ADMIN — CLONAZEPAM SCH MG: 0.5 TABLET ORAL at 10:00

## 2018-10-28 RX ADMIN — CLONAZEPAM SCH MG: 0.5 TABLET ORAL at 09:19

## 2018-10-28 RX ADMIN — ARIPIPRAZOLE SCH MG: 5 TABLET ORAL at 09:19

## 2018-10-28 RX ADMIN — METOPROLOL SUCCINATE SCH MG: 25 TABLET, EXTENDED RELEASE ORAL at 09:19

## 2018-10-28 RX ADMIN — CLONAZEPAM SCH MG: 0.5 TABLET ORAL at 20:58

## 2018-10-28 RX ADMIN — VALPROIC ACID SCH MG: 250 SOLUTION ORAL at 20:26

## 2018-10-29 RX ADMIN — ACETAMINOPHEN PRN MG: 325 TABLET ORAL at 04:05

## 2018-10-29 RX ADMIN — ARIPIPRAZOLE SCH MG: 5 TABLET ORAL at 08:50

## 2018-10-29 RX ADMIN — VALPROIC ACID SCH MG: 250 SOLUTION ORAL at 21:48

## 2018-10-29 RX ADMIN — CLONAZEPAM SCH MG: 0.5 TABLET ORAL at 21:48

## 2018-10-29 RX ADMIN — METOPROLOL SUCCINATE SCH MG: 25 TABLET, EXTENDED RELEASE ORAL at 11:42

## 2018-10-29 RX ADMIN — CLONAZEPAM SCH MG: 0.5 TABLET ORAL at 08:50

## 2018-10-29 NOTE — PN
Subjective





- Subjective


Date of Service: 10/29/18


Service Type: [id number] Hosp care 15 min low complexity


Subjective: 





Patient was in somewhat irritable mood today and refused to talk to the 

provider. Patient has been pacing on the unit aimlessly and appear internally 

preoccupied in his thoughts. Patient has been taking his medications to avoid 

getting IM but still has poor insight and need to comply with it. Patient did 

agree to get his EKG which showed down trend in his QTc (464) on Abilify 10 mg 

and no physical symptoms were reported. Patient has been taking his Lopressor 

and heart rate has been in better control. .





Objective





- Appearance


Appearance: Healthy Appearing


Dysmorphic Features: No


Hygiene: Normal


Grooming: Fairly Well Kept





- Behavior


Psychomotor Activities: Normal


Exhibits Abnormal Movement: No





- Attitude and Relatedness


Attitude and Relatedness: Psychotically Related


Eye Contact: Poor





- Mood


Patient's Decription of Mood: irritable





- Affect


Observed Affect: Tense





- Thought Process


Thought Content: Yes Paranoid Ideation





- Level of Consciousness


Level of Consciousness: Agitated





- Impulse Control


Impulse Control: Poor





- Insight and Judgement


Insight and Judgement: Poor





- Medication Management


Medication Management Adherence: Yes





Assessment





- Assessment


Merits Inpatient Hospitalization: For Immediate Safety, For Stabilization, For 

Discharge Planning


Inpatient DSM-V Dx: F25.0


Clinical Impression: 





This 63-year-old mentally disabled  male known to this facility from 

multiple prior psychiatric hospitalizations, who also suffers from aortic 

stenosis and atrial fibrillation, was brought to the emergency room this time 

once again for appointment and treatment nonadherence even for his ACT team.  

He is extremely tense, irritable, and guarded.





MHU: Problem List





- Patient Problems


(1) Schizoaffective disorder


Current Visit: Yes   Status: Acute   Code(s): F25.9 - SCHIZOAFFECTIVE DISORDER, 

UNSPECIFIED   SNOMED Code(s): 32029462


   





Plan





- Plan


Treatment Plan: 


Name: JYOTI STEEN                        


YOB: 1955                        


[id number]


[id number]








- Patient continues to be hospitalized due to non compliance, unpredictable and 

sexually inappropriate behavior in the community, delusional in his thinking.


- Patient's medications were continued but has been partially compliant. 

Patient was encouraged to comply with his medications. Patient Depakene was 

increased to 1000 mg at Bedtime to help with recurrent sexual thoughts, mood 

instability and sleep disturbance. Patient was continued on Clonazepam 0.5 mg 

BID to help with anxiety, pacing and racing thoughts and if refuse PO to be 

given Ativan IM. 


- Patient next Invega Sustena IM is past due date10/23/18 as last Qtc was 480, 

which was 500 before that. Patient was started on Abilify 10 mg PO daily and 

tolerating it well and last QTc measured was 464 and patient has been 

asymptomatic.


- Patient will be monitored for improvement and side effects. Risk and benefits 

were discussed.


- Patient was encouraged to continue his participation in the milieu, group and 

individual therapy.


Medications: 


 Current Medications





Acetaminophen (Tylenol Tab*)  650 mg PO Q4H PRN


   PRN Reason: PAIN or TEMP > 101 F


   Last Admin: 10/29/18 04:05 Dose:  650 mg


Al Hydrox/Mg Hydrox/Simethicone (Maalox Plus*)  30 ml PO Q4H PRN


   PRN Reason: INDIGESTION


   Last Admin: 10/23/18 02:30 Dose:  30 ml


Aripiprazole (Abilify  Tab*)  10 mg PO DAILY [facility]


   Last Admin: 10/29/18 08:50 Dose:  10 mg


Clonazepam (Klonopin Tab(*))  0.5 mg PO BID [facility]


   Last Admin: 10/29/18 08:50 Dose:  0.5 mg


Lorazepam (Ativan Inj*)  2 mg IM BID PRN


   PRN Reason: if refuse po klonopin/depakene


Metoprolol Succinate (Toprol Xl Tab*)  25 mg PO DAILY [facility]


   Last Admin: 10/29/18 11:42 Dose:  25 mg


Valproic Acid (Depakene Liq(*))  1,000 mg PO BEDTIME [facility]

## 2018-10-30 RX ADMIN — VALPROIC ACID SCH MG: 250 SOLUTION ORAL at 23:02

## 2018-10-30 RX ADMIN — CLONAZEPAM SCH MG: 0.5 TABLET ORAL at 23:02

## 2018-10-30 RX ADMIN — CLONAZEPAM SCH MG: 0.5 TABLET ORAL at 08:38

## 2018-10-30 RX ADMIN — ARIPIPRAZOLE SCH MG: 5 TABLET ORAL at 08:38

## 2018-10-30 RX ADMIN — METOPROLOL SUCCINATE SCH MG: 25 TABLET, EXTENDED RELEASE ORAL at 08:38

## 2018-10-30 NOTE — PN
Subjective





- Subjective


Date of Service: 10/30/18


Service Type: [id number] Hosp care 15 min low complexity


Subjective: 





Patient continues to be irritable in his mood and delusionali n his thinking 

making paranoid and persecutory comments that the provider is "killing him with 

medications" and patient is better off with just "food and exercise" and don't 

need medications. Patient is clear about no taking medications if he was not 

forced due to judges order. Patient has been noticed to be pacing on the unit 

aimlessly and appear internally preoccupied in his thoughts. Patient has poor 

insight and judgment. Patient requires slow titration of his medications and 

EKG monitoring due to his prolong Qtc. Patient has been taking his Lopressor 

and heart rate has been in better control. Patient has court today for his 

request of discharge and team is requesting for retention and clearance to be 

transferred to state facility as patient is showing minimal improvement and 

will require longer term hospitalization.





Objective





- Appearance


Appearance: Healthy Appearing


Dysmorphic Features: No


Hygiene: Normal


Grooming: Fairly Well Kept





- Behavior


Psychomotor Activities: Normal


Exhibits Abnormal Movement: No





- Attitude and Relatedness


Attitude and Relatedness: Psychotically Related


Eye Contact: Poor





- Speech


Quality: Unpressured


Latencies: Normal


Quantity: Terse





- Mood


Patient's Decription of Mood: "Angry"





- Affect


Observed Affect: Labile


Affect Consistent with: Dysphoria





- Thought Process


Patient's Thought Process: Goal Directed


Thought Content: Yes Paranoid Ideation, No Passive Death Wish, No Suicidal 

Planning, No Homicidal Ideation





- Sensorium


Experiencing Hallucinations: No, Sensorium is Clear


Type of Hallucinations: Visual: No, Auditory: No, Command: No





- Level of Consciousness


Level of Consciousness: Agitated


Orientation: Yes Intact, Yes Orientated to Time, Yes Orientated to Place, Yes 

Orientated to Person





- Impulse Control


Impulse Control: Poor - but improving





- Insight and Judgement


Insight and Judgement: Poor





- Medication Management


Medication Management Adherence: Yes





Assessment





- Assessment


Merits Inpatient Hospitalization: For Immediate Safety, For Stabilization, For 

Discharge Planning


Inpatient DSM-V Dx: F25.0


Clinical Impression: 





This 63-year-old mentally disabled  male known to this facility from 

multiple prior psychiatric hospitalizations, who also suffers from aortic 

stenosis and atrial fibrillation, was brought to the emergency room this time 

once again for appointment and treatment nonadherence even for his ACT team.  

He is extremely tense, irritable, and guarded.





MHU: Problem List





- Patient Problems


(1) Schizoaffective disorder


Current Visit: Yes   Status: Acute   Code(s): F25.9 - SCHIZOAFFECTIVE DISORDER, 

UNSPECIFIED   SNOMED Code(s): 36862890


   





Plan





- Plan


Treatment Plan: 


Name: JYOTI STEEN                        


YOB: 1955                        


[id number]


[id number]








- Patient continues to be hospitalized due to non compliance, unpredictable and 

sexually inappropriate behavior in the community, delusional in his thinking.


- Patient's medications were continued but has been partially compliant. 

Patient was encouraged to comply with his medications. Patient Depakene was 

conitnued 1000 mg at Bedtime to help with recurrent sexual thoughts, mood 

instability and sleep disturbance. Patient was continued on Clonazepam 0.5 mg 

BID to help with anxiety, pacing and racing thoughts and if refuse PO to be 

given Ativan IM. 


- Patient next Invega Sustena IM is past due date10/23/18 as last Qtc was 480, 

which was 500 before that. Patient was started on Abilify 10 mg PO daily and 

tolerating it well and last QTc measured was 464 and patient has been 

asymptomatic.


- Patient will be monitored for improvement and side effects. Risk and benefits 

were discussed.


- Patient was encouraged to continue his participation in the milieu, group and 

individual therapy.


Medications: 


 Current Medications





Acetaminophen (Tylenol Tab*)  650 mg PO Q4H PRN


   PRN Reason: PAIN or TEMP > 101 F


   Last Admin: 10/29/18 04:05 Dose:  650 mg


Al Hydrox/Mg Hydrox/Simethicone (Maalox Plus*)  30 ml PO Q4H PRN


   PRN Reason: INDIGESTION


   Last Admin: 10/23/18 02:30 Dose:  30 ml


Aripiprazole (Abilify  Tab*)  10 mg PO DAILY [facility]


   Last Admin: 10/30/18 08:38 Dose:  10 mg


Clonazepam (Klonopin Tab(*))  0.5 mg PO BID [facility]


   Last Admin: 10/30/18 08:38 Dose:  0.5 mg


Lorazepam (Ativan Inj*)  2 mg IM BID PRN


   PRN Reason: if refuse po klonopin/depakene


Metoprolol Succinate (Toprol Xl Tab*)  25 mg PO DAILY [facility]


   Last Admin: 10/30/18 08:38 Dose:  25 mg


Valproic Acid (Depakene Liq(*))  1,000 mg PO BEDTIME [facility]


   Last Admin: 10/29/18 21:48 Dose:  1,000 mg

## 2018-10-31 RX ADMIN — CLONAZEPAM SCH MG: 0.5 TABLET ORAL at 20:34

## 2018-10-31 RX ADMIN — ARIPIPRAZOLE SCH MG: 5 TABLET ORAL at 08:03

## 2018-10-31 RX ADMIN — CLONAZEPAM SCH MG: 0.5 TABLET ORAL at 08:03

## 2018-10-31 RX ADMIN — VALPROIC ACID SCH MG: 250 SOLUTION ORAL at 20:42

## 2018-10-31 RX ADMIN — METOPROLOL SUCCINATE SCH MG: 25 TABLET, EXTENDED RELEASE ORAL at 08:03

## 2018-10-31 NOTE — PN
Subjective





- Subjective


Date of Service: 10/31/18


Service Type: [id number] Hosp care 15 min low complexity


Subjective: 





Patient was in better mood today and less delusional in his thinking, making 

less paranoid and persecutory comments. Patient during the hearing accepted 

that he has a diagnosis of schizoaffective Disorder and would continue with his 

medications. Patient has been noticed to be pacing less on the unit and appear 

less internally preoccupied in his thoughts. Patient reports that he has better 

self control with medications. Patient has shown some improvement in insight 

and judgment. Patient requires slow titration of his medications and EKG 

monitoring due to his prolong Qtc, will get another EKG tomorrow. Patient has 

been taking his Lopressor and heart rate has been in better control. Patient 

was cleared for retention and referral to [facility] facility as he requires longer 

term hospitalization for stabilization.





Objective





- Appearance


Appearance: Healthy Appearing


Dysmorphic Features: No


Hygiene: Normal


Grooming: Fairly Well Kept





- Behavior


Psychomotor Activities: Normal


Exhibits Abnormal Movement: No





- Attitude and Relatedness


Attitude and Relatedness: Superficially Cooperative


Eye Contact: Fair





- Speech


Quality: Unpressured


Latencies: Normal


Quantity: Appropriate





- Mood


Patient's Decription of Mood: "Fine" - less irritable





- Affect


Observed Affect: Fair


Affect Consistent with: Dysphoria





- Thought Process


Patient's Thought Process: Goal Directed


Thought Content: Yes Paranoid Ideation - less intense, No Passive Death Wish, 

No Suicidal Planning, No Homicidal Ideation





- Sensorium


Experiencing Hallucinations: No, Sensorium is Clear


Type of Hallucinations: Visual: No, Auditory: No, Command: No





- Level of Consciousness


Level of Consciousness: Alert


Orientation: Yes Intact, Yes Orientated to Time, Yes Orientated to Place, Yes 

Orientated to Person





- Impulse Control


Impulse Control: Intact





- Insight and Judgement


Insight and Judgement: Poor - but improving





- Group Participation


Particating in Group Activities: Yes





- Medication Management


Medication Management Adherence: Yes





Assessment





- Assessment


Merits Inpatient Hospitalization: For Immediate Safety, For Stabilization, For 

Discharge Planning


Inpatient DSM-V Dx: F25.0


Clinical Impression: 





This 63-year-old mentally disabled  male known to this facility from 

multiple prior psychiatric hospitalizations, who also suffers from aortic 

stenosis and atrial fibrillation, was brought to the emergency room this time 

once again for appointment and treatment nonadherence even for his ACT team.  

He is extremely tense, irritable, and guarded.





MHU: Problem List





- Patient Problems


(1) Schizoaffective disorder


Current Visit: Yes   Status: Acute   Code(s): F25.9 - SCHIZOAFFECTIVE DISORDER, 

UNSPECIFIED   SNOMED Code(s): 71400070


   





Plan





- Plan


Treatment Plan: 


Name: JYOTI STEEN                        


YOB: 1955                        


[id number]


[id number]








- Patient continues to be hospitalized due to non compliance, unpredictable and 

sexually inappropriate behavior in the community, delusional in his thinking.


- Patient's medications were continued but has been partially compliant. 

Patient was encouraged to comply with his medications. Patient has been showing 

improvement and Depakene was continued at 1000 mg at Bedtime to help with 

recurrent sexual thoughts, mood instability and sleep disturbance. Patient was 

continued on Clonazepam 0.5 mg BID to help with anxiety, pacing and racing 

thoughts and if refuse PO to be given Ativan IM. 


- Patient next Invega Sustena IM is past due date10/23/18 as last Qtc was 480, 

which was 500 before that. Patient was started on Abilify 10 mg PO daily and 

tolerating it well and last QTc measured was 464 and patient has been 

asymptomatic. Will repeat EKG tomorrow for QTc .


- Patient will be monitored for improvement and side effects. Risk and benefits 

were discussed.


- Patient was encouraged to continue his participation in the milieu, group and 

individual therapy.


Medications: 


 Current Medications





Acetaminophen (Tylenol Tab*)  650 mg PO Q4H PRN


   PRN Reason: PAIN or TEMP > 101 F


   Last Admin: 10/29/18 04:05 Dose:  650 mg


Al Hydrox/Mg Hydrox/Simethicone (Maalox Plus*)  30 ml PO Q4H PRN


   PRN Reason: INDIGESTION


   Last Admin: 10/23/18 02:30 Dose:  30 ml


Aripiprazole (Abilify  Tab*)  10 mg PO DAILY [facility]


   Last Admin: 10/31/18 08:03 Dose:  10 mg


Clonazepam (Klonopin Tab(*))  0.5 mg PO BID [facility]


   Last Admin: 10/31/18 08:03 Dose:  0.5 mg


Lorazepam (Ativan Inj*)  2 mg IM BID PRN


   PRN Reason: if refuse po klonopin/depakene


Metoprolol Succinate (Toprol Xl Tab*)  25 mg PO DAILY [facility]


   Last Admin: 10/31/18 08:03 Dose:  25 mg


Valproic Acid (Depakene Liq(*))  1,000 mg PO BEDTIME [facility]


   Last Admin: 10/30/18 23:02 Dose:  1,000 mg

## 2018-11-01 RX ADMIN — CLONAZEPAM SCH MG: 0.5 TABLET ORAL at 08:07

## 2018-11-01 RX ADMIN — CLONAZEPAM SCH MG: 0.5 TABLET ORAL at 21:20

## 2018-11-01 RX ADMIN — ARIPIPRAZOLE SCH MG: 5 TABLET ORAL at 08:07

## 2018-11-01 RX ADMIN — VALPROIC ACID SCH MG: 250 SOLUTION ORAL at 21:21

## 2018-11-01 RX ADMIN — METOPROLOL SUCCINATE SCH MG: 25 TABLET, EXTENDED RELEASE ORAL at 08:08

## 2018-11-01 NOTE — PN
Subjective





- Subjective


Date of Service: 11/01/18


Service Type: [id number] Hosp care 15 min low complexity


Subjective: 





Patient was in better mood today and less delusional in his thinking, making 

less paranoid and persecutory comments. Patient has been noticed to be pacing 

less on the unit and appear less internally preoccupied in his thoughts. 

Patient reports that he has better self control with medications. Patient has 

shown some improvement in insight and judgment. Patient requires slow titration 

of his medications and EKG monitoring due to his prolong Qtc, ekg today 

reflected reduction in Qtc to 450's. Patient has been taking his Lopressor and 

heart rate has been in better control. Patient was cleared for retention and 

referral to [facility] facility as he requires longer term hospitalization for 

stabilization.





Objective





- Appearance


Appearance: Healthy Appearing


Dysmorphic Features: No


Hygiene: Normal


Grooming: Fairly Well Kept





- Behavior


Psychomotor Activities: Normal


Exhibits Abnormal Movement: No





- Attitude and Relatedness


Attitude and Relatedness: Superficially Cooperative


Eye Contact: Fair





- Speech


Quality: Unpressured


Latencies: Normal


Quantity: Terse





- Mood


Patient's Decription of Mood: "Fine"





- Affect


Observed Affect: Fair


Affect Consistent with: Dysphoria





- Thought Process


Patient's Thought Process: Goal Directed


Thought Content: Yes Paranoid Ideation - towards others on the unit that they 

can hear him when asked about his sexual thougths, but reprots that has better 

self control with medications, No Passive Death Wish, No Suicidal Planning, No 

Homicidal Ideation





- Sensorium


Experiencing Hallucinations: No, Sensorium is Clear


Type of Hallucinations: Visual: No, Auditory: No, Command: No





- Level of Consciousness


Level of Consciousness: Alert


Orientation: Yes Intact, Yes Orientated to Time, Yes Orientated to Place, Yes 

Orientated to Person





- Impulse Control


Impulse Control: Intact





- Insight and Judgement


Insight and Judgement: Poor - but improving





- Medication Management


Medication Management Adherence: Yes





Assessment





- Assessment


Merits Inpatient Hospitalization: For Immediate Safety, For Stabilization, For 

Discharge Planning


Inpatient DSM-V Dx: F25.0


Clinical Impression: 





This 63-year-old mentally disabled  male known to this facility from 

multiple prior psychiatric hospitalizations, who also suffers from aortic 

stenosis and atrial fibrillation, was brought to the emergency room this time 

once again for appointment and treatment nonadherence even for his ACT team.  

He is extremely tense, irritable, and guarded.





MHU: Problem List





- Patient Problems


(1) Schizoaffective disorder


Current Visit: Yes   Status: Acute   Code(s): F25.9 - SCHIZOAFFECTIVE DISORDER, 

UNSPECIFIED   SNOMED Code(s): 25480856


   





Plan





- Plan


Treatment Plan: 


Name: JYOTI STEEN                        


YOB: 1955                        


[id number]


[id number]








- Patient continues to be hospitalized due to non compliance, unpredictable and 

sexually inappropriate behavior in the community, delusional in his thinking.


- Patient's medications were continued but has been partially compliant. 

Patient was encouraged to comply with his medications. Patient has been showing 

improvement and Depakene was continued at 1000 mg at Bedtime to help with 

recurrent sexual thoughts, mood instability and sleep disturbance. Patient was 

continued on Clonazepam 0.5 mg BID to help with anxiety, pacing and racing 

thoughts and if refuse PO to be given Ativan IM. 


-Patient needs Valproic Acid level, CBC, CMP to be drawn tomorrow morning.


- Patient Qtc was in 450's today will increase Abilify to 15 mg PO QAM and 

continue to monitor improvement. .


- Patient will be monitored for improvement and side effects. Risk and benefits 

were discussed.


- Patient was encouraged to continue his participation in the milieu, group and 

individual therapy.


Medications: 


 Current Medications





Acetaminophen (Tylenol Tab*)  650 mg PO Q4H PRN


   PRN Reason: PAIN or TEMP > 101 F


   Last Admin: 10/29/18 04:05 Dose:  650 mg


Al Hydrox/Mg Hydrox/Simethicone (Maalox Plus*)  30 ml PO Q4H PRN


   PRN Reason: INDIGESTION


   Last Admin: 10/23/18 02:30 Dose:  30 ml


Aripiprazole (Abilify Tab*)  15 mg PO DAILY [facility]


Clonazepam (Klonopin Tab(*))  0.5 mg PO BID [facility]


   Last Admin: 11/01/18 08:07 Dose:  0.5 mg


Lorazepam (Ativan Inj*)  2 mg IM BID PRN


   PRN Reason: if refuse po klonopin/depakene


Metoprolol Succinate (Toprol Xl Tab*)  25 mg PO DAILY [facility]


   Last Admin: 11/01/18 08:08 Dose:  25 mg


Valproic Acid (Depakene Liq(*))  1,000 mg PO BEDTIME [facility]


   Last Admin: 10/31/18 20:42 Dose:  1,000 mg

## 2018-11-02 RX ADMIN — ARIPIPRAZOLE SCH MG: 15 TABLET ORAL at 09:06

## 2018-11-02 RX ADMIN — CLONAZEPAM SCH MG: 0.5 TABLET ORAL at 09:06

## 2018-11-02 RX ADMIN — METOPROLOL SUCCINATE SCH MG: 25 TABLET, EXTENDED RELEASE ORAL at 09:06

## 2018-11-02 RX ADMIN — CLONAZEPAM SCH MG: 0.5 TABLET ORAL at 22:12

## 2018-11-02 RX ADMIN — VALPROIC ACID SCH MG: 250 SOLUTION ORAL at 22:11

## 2018-11-02 NOTE — PN
Subjective





- Subjective


Date of Service: 11/02/18


Service Type: 06230 Hosp care 15 min low complexity


Subjective: 





Patient has been showing improvement, less instability in mood and he was in 

better mood today as well and less delusional in his thinking, did not make any 

paranoid and persecutory comments. Patient was questioning about his need to 

stay in the hospital as he assured his compliance and is noticing good 

improvement in his mood and thinkings. Patient agrees to follow up with his ACT 

team and treatment offered. Patient has been noticed to be pacing less on the 

unit and less internally preoccupied in his thoughts. Patient reports that he 

has better self control with medication and he likes that. Patient has shown 

some improvement in insight and judgment. Patient requires slow titration of 

his medications and EKG monitoring due to his prolong Qtc, ekg to be repeated 

again on Monday after increment in Abilify. Patient has been taking his 

Lopressor and heart rate has been in better control. Patient was cleared for 

retention and referral to [facility] facility as he requires longer term 

hospitalization for stabilization.








Objective





- Appearance


Appearance: Healthy Appearing


Dysmorphic Features: No


Hygiene: Normal


Grooming: Fairly Well Kept





- Behavior


Psychomotor Activities: Normal


Exhibits Abnormal Movement: No





- Attitude and Relatedness


Attitude and Relatedness: Cooperative


Eye Contact: Fair





- Speech


Quality: Unpressured


Latencies: Normal


Quantity: Terse - but improving





- Mood


Patient's Decription of Mood: "Fine"





- Affect


Observed Affect: Fair





- Thought Process


Patient's Thought Process: Goal Directed


Thought Content: Yes Paranoid Ideation - less, No Passive Death Wish, No 

Suicidal Planning, No Homicidal Ideation





- Sensorium


Experiencing Hallucinations: No, Sensorium is Clear


Type of Hallucinations: Visual: No, Auditory: No, Command: No





- Level of Consciousness


Level of Consciousness: Alert


Orientation: Yes Intact, Yes Orientated to Time, Yes Orientated to Place, Yes 

Orientated to Person





- Impulse Control


Impulse Control: Intact





- Insight and Judgement


Insight and Judgement: Fair - showing improvement from before





- Medication Management


Medication Management Adherence: Yes





Assessment





- Assessment


Merits Inpatient Hospitalization: For Immediate Safety, For Stabilization, For 

Discharge Planning


Inpatient DSM-V Dx: F25.0


Clinical Impression: 





This 63-year-old mentally disabled  male known to this facility from 

multiple prior psychiatric hospitalizations, who also suffers from aortic 

stenosis and atrial fibrillation, was brought to the emergency room this time 

once again for appointment and treatment nonadherence even for his ACT team.  

He is extremely tense, irritable, and guarded.





MHU: Problem List





- Patient Problems


(1) Schizoaffective disorder


Current Visit: Yes   Status: Acute   Code(s): F25.9 - SCHIZOAFFECTIVE DISORDER, 

UNSPECIFIED   SNOMED Code(s): 37639646


   





Plan





- Plan


Treatment Plan: 


Name: JYOTI STEEN                        


YOB: 1955                        


[id number]


[id number]








- Patient continues to be hospitalized due to non compliance, unpredictable and 

sexually inappropriate behavior in the community, delusional in his thinking.


- Patient's medications were continued but has been partially compliant. 

Patient was encouraged to comply with his medications. Patient has been showing 

improvement and Depakene was continued at 1000 mg at Bedtime to help with 

recurrent sexual thoughts, mood instability and sleep disturbance. Patient  was 

continued on Clonazepam 0.5 mg BID to help with anxiety, pacing and racing 

thoughts. 


-Patient needs Valproic Acid level, CBC, CMP refused it today and stated if it 

can be done after break fast. Patient will done tomorrow morning.


- Abilify at 15 mg PO QAM, will obtain EKG on Monday and continue to monitor 

improvement.  to assist with discharge planning if patient 

improves before transfer to state facility.


- Patient will be monitored for improvement and side effects. Risk and benefits 

were discussed.


- Patient was encouraged to continue his participation in the milieu, group and 

individual therapy.


Medications: 


 Current Medications





Acetaminophen (Tylenol Tab*)  650 mg PO Q4H PRN


   PRN Reason: PAIN or TEMP > 101 F


   Last Admin: 10/29/18 04:05 Dose:  650 mg


Al Hydrox/Mg Hydrox/Simethicone (Maalox Plus*)  30 ml PO Q4H PRN


   PRN Reason: INDIGESTION


   Last Admin: 10/23/18 02:30 Dose:  30 ml


Aripiprazole (Abilify Tab*)  15 mg PO DAILY [facility]


   Last Admin: 11/02/18 09:06 Dose:  15 mg


Clonazepam (Klonopin Tab(*))  0.5 mg PO BID [facility]


   Last Admin: 11/02/18 09:06 Dose:  0.5 mg


Metoprolol Succinate (Toprol Xl Tab*)  25 mg PO DAILY [facility]


   Last Admin: 11/02/18 09:06 Dose:  25 mg


Valproic Acid (Depakene Liq(*))  1,000 mg PO BEDTIME [facility]


   Last Admin: 11/01/18 21:21 Dose:  1,000 mg

## 2018-11-03 LAB
BASOPHILS # BLD AUTO: 0 10^3/UL (ref 0–0.2)
EOSINOPHIL # BLD AUTO: 0.1 10^3/UL (ref 0–0.6)
HCT VFR BLD AUTO: 40 % (ref 42–52)
HGB BLD-MCNC: 14.2 G/DL (ref 14–18)
LYMPHOCYTES # BLD AUTO: 1.2 10^3/UL (ref 1–4.8)
MCH RBC QN AUTO: 35 PG (ref 27–31)
MCHC RBC AUTO-ENTMCNC: 36 G/DL (ref 31–36)
MCV RBC AUTO: 98 FL (ref 80–94)
MONOCYTES # BLD AUTO: 0.5 10^3/UL (ref 0–0.8)
NEUTROPHILS # BLD AUTO: 2 10^3/UL (ref 1.5–7.7)
NRBC # BLD AUTO: 0 10^3/UL
NRBC BLD QL AUTO: 0
PLATELET # BLD AUTO: 115 10^3/UL (ref 150–450)
RBC # BLD AUTO: 4.1 10^6/UL (ref 4–5.4)
WBC # BLD AUTO: 3.8 10^3/UL (ref 3.5–10.8)

## 2018-11-03 RX ADMIN — ARIPIPRAZOLE SCH MG: 15 TABLET ORAL at 08:12

## 2018-11-03 RX ADMIN — METOPROLOL SUCCINATE SCH MG: 25 TABLET, EXTENDED RELEASE ORAL at 08:12

## 2018-11-03 RX ADMIN — CLONAZEPAM SCH MG: 0.5 TABLET ORAL at 08:12

## 2018-11-03 RX ADMIN — CLONAZEPAM SCH MG: 0.5 TABLET ORAL at 21:48

## 2018-11-03 RX ADMIN — VALPROIC ACID SCH MG: 250 SOLUTION ORAL at 21:47

## 2018-11-03 RX ADMIN — ALUMINUM HYDROXIDE, MAGNESIUM HYDROXIDE, AND SIMETHICONE PRN ML: 200; 200; 20 SUSPENSION ORAL at 00:00

## 2018-11-04 RX ADMIN — ARIPIPRAZOLE SCH MG: 15 TABLET ORAL at 08:34

## 2018-11-04 RX ADMIN — METOPROLOL SUCCINATE SCH MG: 25 TABLET, EXTENDED RELEASE ORAL at 08:34

## 2018-11-04 RX ADMIN — CLONAZEPAM SCH MG: 0.5 TABLET ORAL at 21:51

## 2018-11-04 RX ADMIN — CLONAZEPAM SCH MG: 0.5 TABLET ORAL at 08:34

## 2018-11-04 RX ADMIN — VALPROIC ACID SCH MG: 250 SOLUTION ORAL at 21:51

## 2018-11-05 RX ADMIN — CLONAZEPAM SCH MG: 0.5 TABLET ORAL at 21:10

## 2018-11-05 RX ADMIN — ARIPIPRAZOLE SCH MG: 15 TABLET ORAL at 08:14

## 2018-11-05 RX ADMIN — METOPROLOL SUCCINATE SCH MG: 25 TABLET, EXTENDED RELEASE ORAL at 08:14

## 2018-11-05 RX ADMIN — VALPROIC ACID SCH MG: 250 SOLUTION ORAL at 21:10

## 2018-11-05 RX ADMIN — CLONAZEPAM SCH MG: 0.5 TABLET ORAL at 08:15

## 2018-11-05 NOTE — PN
Subjective





- Subjective


Date of Service: 11/05/18


Service Type: [id number] Hosp care 15 min low complexity


Subjective: 





Patient has been showing ongoing improvement,appear depressed today and less 

delusional in his thinking, did not make any paranoid and persecutory comments 

today. Patient was questioning about his need to stay in the hospital as he 

assured his compliance and is noticing good improvement in his mood and 

thinkings. Patient agrees to follow up with his ACT team and treatment offered. 

Patient has been noticed to be pacing less on the unit and less internally 

preoccupied in his thoughts. Patient had difficulty with his sleep last night 

but insist on lowering dose of Clonazepam for unknown reason and also asking 

Depakene to be lowered and making excuses that taste is is not good. Patient 

has shown some improvement in insight and judgment but fluctuates. Patient EKG 

monitoring dsiplayed <450 Qtc with 15 mg of Abilify. Patient has been taking 

his Lopressor and heart rate has been in better control. Patient was cleared 

for retention and referral to [facility] facility.





Objective





- Appearance


Appearance: Healthy Appearing


Dysmorphic Features: No


Hygiene: Normal


Grooming: Fairly Well Kept





- Behavior


Psychomotor Activities: Normal





- Attitude and Relatedness


Attitude and Relatedness: Cooperative


Eye Contact: Fair





- Speech


Quality: Unpressured


Latencies: Normal


Quantity: Appropriate





- Mood


Patient's Decription of Mood: "Fine"





- Affect


Observed Affect: Fair


Affect Consistent with: Dysphoria





- Thought Process


Thought Content: No Passive Death Wish, No Suicidal Planning, No Homicidal 

Ideation, No Paranoid Ideation





- Sensorium


Experiencing Hallucinations: No, Sensorium is Clear


Type of Hallucinations: Visual: No, Auditory: No, Command: No





- Level of Consciousness


Level of Consciousness: Alert


Orientation: Yes Intact, Yes Orientated to Time, Yes Orientated to Place, Yes 

Orientated to Person





- Impulse Control


Impulse Control: Intact





- Insight and Judgement


Insight and Judgement: Poor - but improving overtime





- Medication Management


Medication Management Adherence: Yes





Assessment





- Assessment


Merits Inpatient Hospitalization: For Immediate Safety, For Stabilization, For 

Discharge Planning


Inpatient DSM-V Dx: F25.0


Clinical Impression: 





This 63-year-old mentally disabled  male known to this facility from 

multiple prior psychiatric hospitalizations, who also suffers from aortic 

stenosis and atrial fibrillation, was brought to the emergency room this time 

once again for appointment and treatment nonadherence even for his ACT team.  

He is extremely tense, irritable, and guarded.





U: Problem List





- Patient Problems


(1) Schizoaffective disorder


Current Visit: Yes   Status: Acute   Code(s): F25.9 - SCHIZOAFFECTIVE DISORDER, 

UNSPECIFIED   SNOMED Code(s): 17349261


   





Plan





- Plan


Treatment Plan: 


Name: JYOTI STEEN                        


YOB: 1955                        


[id number]


[id number]








- Patient continues to be hospitalized due to non compliance, unpredictable and 

sexually inappropriate behavior in the community, delusional in his thinking.


- Patient's medications were continued but has been partially compliant. 

Patient was encouraged to comply with his medications. Patient has been showing 

improvement and Depakene was continued at 1000 mg at Bedtime to help with 

recurrent sexual thoughts, mood instability and sleep disturbance. Patient  

Clonazepam to be reduced to 0.25 mg BID to help with anxiety, pacing and racing 

thoughts as per patient's request. 


-Patient Valproic Acid level was 85, CBC, CMP were grossly with in normal 

limits. 


- Abilify at 15 mg PO QAM, EKG with in normal limits.  to assist 

with discharge planning if patient improves before transfer to state facility.


- Patient will be monitored for improvement and side effects. Risk and benefits 

were discussed.


- Patient was encouraged to continue his participation in the milieu, group and 

individual therapy.


Medications: 


 Current Medications





Acetaminophen (Tylenol Tab*)  650 mg PO Q4H PRN


   PRN Reason: PAIN or TEMP > 101 F


   Last Admin: 10/29/18 04:05 Dose:  650 mg


Al Hydrox/Mg Hydrox/Simethicone (Maalox Plus*)  30 ml PO Q4H PRN


   PRN Reason: INDIGESTION


   Last Admin: 11/03/18 00:00 Dose:  30 ml


Aripiprazole (Abilify Tab*)  15 mg PO DAILY [facility]


   Last Admin: 11/05/18 08:14 Dose:  15 mg


Clonazepam (Klonopin Tab(*))  0.5 mg PO BID [facility]


   Last Admin: 11/05/18 08:15 Dose:  0.5 mg


Metoprolol Succinate (Toprol Xl Tab*)  25 mg PO DAILY [facility]


   Last Admin: 11/05/18 08:14 Dose:  25 mg


Valproic Acid (Depakene Liq(*))  1,000 mg PO BEDTIME [facility]


   Last Admin: 11/04/18 21:51 Dose:  1,000 mg

## 2018-11-06 RX ADMIN — CLONAZEPAM SCH MG: 0.5 TABLET ORAL at 21:28

## 2018-11-06 RX ADMIN — VALPROIC ACID SCH MG: 250 SOLUTION ORAL at 21:30

## 2018-11-06 RX ADMIN — ARIPIPRAZOLE SCH MG: 15 TABLET ORAL at 08:01

## 2018-11-06 RX ADMIN — METOPROLOL SUCCINATE SCH MG: 25 TABLET, EXTENDED RELEASE ORAL at 08:01

## 2018-11-06 RX ADMIN — CLONAZEPAM SCH MG: 0.5 TABLET ORAL at 08:01

## 2018-11-07 RX ADMIN — CLONAZEPAM SCH MG: 0.5 TABLET ORAL at 21:13

## 2018-11-07 RX ADMIN — ARIPIPRAZOLE SCH MG: 15 TABLET ORAL at 08:13

## 2018-11-07 RX ADMIN — METOPROLOL SUCCINATE SCH MG: 25 TABLET, EXTENDED RELEASE ORAL at 08:13

## 2018-11-07 RX ADMIN — CLONAZEPAM SCH MG: 0.5 TABLET ORAL at 08:13

## 2018-11-07 RX ADMIN — VALPROIC ACID SCH MG: 250 SOLUTION ORAL at 21:14

## 2018-11-07 NOTE — PN
Subjective





- Subjective


Date of Service: 11/07/18


Service Type: 80268 Hosp care 15 min low complexity


Subjective: 





Patient appear sad and depressed but has been less delusional in his thinking, 

did not make any paranoid and persecutory comments today. But continues to have 

difficulty having insight into his medical and psychiatric illness. Patient was 

making statement of not having medical illness. Patient compliance on 

outpatient basis is a question even though he agrees to follow up with ACT 

team. Patient resisting increment in Abilify or switching to injectable even 

though was educated that EKG has come out to be normal. Patient has been 

noticed to be stressed and internally preoccupied in his thoughts. Patient 

wants to continue with low dose Clonazepam and oppose to increase at this time. 

Patient has shown some improvement but fluctuates and currently accepted at [facility] 

for longer term hospitalization for further stabilization and transitioning 

back into the community. Patient has been taking his Lopressor and heart rate 

has been in better control. 





Objective





- Appearance


Appearance: Healthy Appearing


Dysmorphic Features: No


Hygiene: Normal


Grooming: Fairly Well Kept





- Behavior


Psychomotor Activities: Normal


Exhibits Abnormal Movement: No





- Attitude and Relatedness


Attitude and Relatedness: Cooperative


Eye Contact: Fair





- Speech


Quality: Unpressured


Latencies: Normal


Quantity: Appropriate





- Mood


Patient's Decription of Mood: "Sad"





- Affect


Observed Affect: Fair


Affect Consistent with: Dysphoria





- Thought Process


Patient's Thought Process: Goal Directed


Thought Content: No Passive Death Wish, No Suicidal Planning, No Homicidal 

Ideation, No Paranoid Ideation





- Sensorium


Experiencing Hallucinations: No, Sensorium is Clear


Type of Hallucinations: Visual: No, Auditory: No, Command: No





- Level of Consciousness


Level of Consciousness: Alert


Orientation: Yes Intact, Yes Orientated to Time, Yes Orientated to Place, Yes 

Orientated to Person





- Impulse Control


Impulse Control: Intact





- Insight and Judgement


Insight and Judgement: Poor - but improving





- Medication Management


Medication Management Adherence: Yes





Assessment





- Assessment


Merits Inpatient Hospitalization: For Immediate Safety, For Stabilization, For 

Discharge Planning


Inpatient DSM-V Dx: F25.0


Clinical Impression: 





This 63-year-old mentally disabled  male known to this facility from 

multiple prior psychiatric hospitalizations, who also suffers from aortic 

stenosis and atrial fibrillation, was brought to the emergency room this time 

once again for appointment and treatment nonadherence even for his ACT team.  

He is extremely tense, irritable, and guarded.





MHU: Problem List





- Patient Problems


(1) Schizoaffective disorder


Current Visit: Yes   Status: Acute   Code(s): F25.9 - SCHIZOAFFECTIVE DISORDER, 

UNSPECIFIED   SNOMED Code(s): 20665533


   





Plan





- Plan


Treatment Plan: 


Name: JYOTI STEEN                        


YOB: 1955                        


[id number]


[id number]








- Patient continues to be hospitalized due to non compliance, unpredictable and 

sexually inappropriate behavior in the community, delusional in his thinking.


- Patient's medications were continued and has been compliant. Patient has been 

showing improvement and Depakene was continued at 1000 mg at Bedtime to help 

with recurrent sexual thoughts, mood instability and sleep disturbance. Patient

  Clonazepam to be reduced to 0.25 mg BID to help with anxiety, pacing and 

racing thoughts as per patient's request. 


-Patient Valproic Acid level was 85, CBC, CMP were grossly with in normal 

limits. 


- Abilify at 15 mg PO QAM with plan to titrate as needed and switch it to 

injectable Abilify as tolerated given EKG and QTc are with in safe range.


- Patient will be monitored for improvement and side effects. Risk and benefits 

were discussed.


-Patient  accepted at [facility], in transition to be transferred.


- Patient was encouraged to continue his participation in the milieu, group and 

individual therapy.


Medications: 


 Current Medications





Acetaminophen (Tylenol Tab*)  650 mg PO Q4H PRN


   PRN Reason: PAIN or TEMP > 101 F


   Last Admin: 10/29/18 04:05 Dose:  650 mg


Al Hydrox/Mg Hydrox/Simethicone (Maalox Plus*)  30 ml PO Q4H PRN


   PRN Reason: INDIGESTION


   Last Admin: 11/03/18 00:00 Dose:  30 ml


Aripiprazole (Abilify Tab*)  15 mg PO DAILY [facility]


   Last Admin: 11/07/18 08:13 Dose:  15 mg


Clonazepam (Klonopin Tab(*))  0.25 mg PO BID [facility]


   Last Admin: 11/07/18 08:13 Dose:  0.25 mg


Metoprolol Succinate (Toprol Xl Tab*)  25 mg PO DAILY [facility]


   Last Admin: 11/07/18 08:13 Dose:  25 mg


Valproic Acid (Depakene Liq(*))  1,000 mg PO BEDTIME [facility]


   Last Admin: 11/06/18 21:30 Dose:  1,000 mg

## 2018-11-08 RX ADMIN — METOPROLOL SUCCINATE SCH MG: 25 TABLET, EXTENDED RELEASE ORAL at 08:06

## 2018-11-08 RX ADMIN — CLONAZEPAM SCH MG: 0.5 TABLET ORAL at 08:06

## 2018-11-08 RX ADMIN — VALPROIC ACID SCH MG: 250 SOLUTION ORAL at 21:21

## 2018-11-08 RX ADMIN — ARIPIPRAZOLE SCH MG: 15 TABLET ORAL at 08:06

## 2018-11-08 RX ADMIN — CLONAZEPAM SCH MG: 0.5 TABLET ORAL at 21:20

## 2018-11-08 NOTE — PN
Subjective





- Subjective


Date of Service: 11/08/18


Service Type: 85208 Hosp care 15 min low complexity


Subjective: 





Patient was seen, discussed with team. Patient continues with her trust issues 

and some paranoia towards others. Patient has been showing improvement. Patient 

insight in his illness is improving as well and has been compliant with 

treatment. Patient requesting Depakene to be lowered and reports having nausea 

due to that. Patient has no episodes of vomiting. Patient was encouraged to 

switch liquid to pill/sprinkle. But insisting on lowering of Depakene. Patient 

continues to pace on the unit internally preoccupied in his thoughts which he 

reserves to share. Patient do not appear to respond to any stimuli. Patient 

wanted to write a cheque for a friend that helps him out in the community and 

with transportation.





Objective





- Appearance


Appearance: Healthy Appearing


Dysmorphic Features: No


Hygiene: Normal


Grooming: Fairly Well Kept





- Behavior


Psychomotor Activities: Normal


Exhibits Abnormal Movement: No





- Attitude and Relatedness


Attitude and Relatedness: Superficially Cooperative


Eye Contact: Fair





- Speech


Quality: Unpressured


Latencies: Normal


Quantity: Appropriate





- Mood


Patient's Decription of Mood: "Okay"





- Affect


Observed Affect: Fair


Affect Consistent with: Euthymia





- Thought Process


Patient's Thought Process: Goal Directed


Thought Content: Yes Paranoid Ideation - less intense, No Passive Death Wish, 

No Suicidal Planning, No Homicidal Ideation





- Sensorium


Experiencing Hallucinations: No, Sensorium is Clear


Type of Hallucinations: Visual: No, Auditory: No, Command: No





- Level of Consciousness


Level of Consciousness: Alert


Orientation: Yes Intact, Yes Orientated to Time, Yes Orientated to Place, Yes 

Orientated to Person





- Impulse Control


Impulse Control: Intact





- Insight and Judgement


Insight and Judgement: Poor - flutuates, but improving





- Medication Management


Medication Management Adherence: Yes





Assessment





- Assessment


Merits Inpatient Hospitalization: For Immediate Safety, For Stabilization, For 

Discharge Planning


Inpatient DSM-V Dx: F25.0


Clinical Impression: 





This 63-year-old mentally disabled  male known to this facility from 

multiple prior psychiatric hospitalizations, who also suffers from aortic 

stenosis and atrial fibrillation, was brought to the emergency room this time 

once again for appointment and treatment nonadherence even for his ACT team.  

He is extremely tense, irritable, and guarded.





MHU: Problem List





- Patient Problems


(1) Schizoaffective disorder


Current Visit: Yes   Status: Acute   Code(s): F25.9 - SCHIZOAFFECTIVE DISORDER, 

UNSPECIFIED   SNOMED Code(s): 14766727


   





Plan





- Plan


Treatment Plan: 


Name: JYOTI STEEN                        


YOB: 1955                        


[id number]


[id number]








- Patient continues to be hospitalized due to non compliance, unpredictable and 

sexually inappropriate behavior in the community, delusional in his thinking.


- Patient's medications were continued and has been compliant. Patient has been 

showing improvement and Depakene was reduced to 750 mg at Bedtime to help with 

recurrent sexual thoughts, mood instability and sleep disturbance. Patient  

Clonazepam to be reduced to 0.25 mg BID to help with anxiety, pacing and racing 

thoughts as per patient's request. 


-Physical therapy was called as per patient request as he is reporting some 

discomfort with range of motion on Rt shoulder. 


- Abilify at 15 mg PO QAM with plan to titrate as needed and switch it to 

injectable Abilify as tolerated given EKG and QTc are with in safe range.


- Patient will be monitored for improvement and side effects. Risk and benefits 

were discussed.


-Patient  accepted at [facility], in transition to be transferred coming Monday 11/12/ 18.


- Patient was encouraged to continue his participation in the milieu, group and 

individual therapy.


Medications: 


 Current Medications





Acetaminophen (Tylenol Tab*)  650 mg PO Q4H PRN


   PRN Reason: PAIN or TEMP > 101 F


   Last Admin: 10/29/18 04:05 Dose:  650 mg


Al Hydrox/Mg Hydrox/Simethicone (Maalox Plus*)  30 ml PO Q4H PRN


   PRN Reason: INDIGESTION


   Last Admin: 11/03/18 00:00 Dose:  30 ml


Aripiprazole (Abilify Tab*)  15 mg PO DAILY [facility]


   Last Admin: 11/08/18 08:06 Dose:  15 mg


Clonazepam (Klonopin Tab(*))  0.25 mg PO BID [facility]


   Last Admin: 11/08/18 08:06 Dose:  0.25 mg


Metoprolol Succinate (Toprol Xl Tab*)  25 mg PO DAILY [facility]


   Last Admin: 11/08/18 08:06 Dose:  25 mg


Valproic Acid (Depakene Liq(*))  750 mg PO BEDTIME [facility]

## 2018-11-09 RX ADMIN — CLONAZEPAM SCH MG: 0.5 TABLET ORAL at 07:44

## 2018-11-09 RX ADMIN — METOPROLOL SUCCINATE SCH MG: 25 TABLET, EXTENDED RELEASE ORAL at 07:44

## 2018-11-09 RX ADMIN — ACETAMINOPHEN PRN MG: 325 TABLET ORAL at 05:35

## 2018-11-09 RX ADMIN — CLONAZEPAM SCH MG: 0.5 TABLET ORAL at 20:55

## 2018-11-09 RX ADMIN — ARIPIPRAZOLE SCH MG: 15 TABLET ORAL at 07:43

## 2018-11-09 RX ADMIN — VALPROIC ACID SCH MG: 250 SOLUTION ORAL at 20:54

## 2018-11-09 NOTE — PN
Subjective





- Subjective


Date of Service: 11/09/18


Service Type: 84777 Hosp care 15 min low complexity


Subjective: 





Patient was seen, discussed with team. Patient continues with his trust issues 

and paranoia towards others. Patient nadia that his dose of Abilify and was not 

happy about that, but was educated that writer intends to but currently he is 

at the same dose of medications. Patient was educated that color of the pill 

might be different but it has been at the same dose. Patient was educated by 

med nurse on that but held on to that thought due to his trust issue and 

paranoia. And feeling that he is not given the right medications. Patient has 

been showing gradual improvement. Patient insight in his illness is improving 

as well and has been compliant with treatment but resistant to increment in 

dose. Patient reports feeling better with this dose of Depakene and improved 

nausea. Patient had no episodes of vomiting. Patient pace on the unit 

internally preoccupied in his thoughts which he reserves to himself. Patient do 

not appear to respond to any stimuli. Patient antipsychotics has been increased 

slowly due to previous [id number] with EKG.





Objective





- Appearance


Appearance: Healthy Appearing


Dysmorphic Features: No


Hygiene: Normal


Grooming: Fairly Well Kept





- Behavior


Psychomotor Activities: Normal


Exhibits Abnormal Movement: No





- Attitude and Relatedness


Attitude and Relatedness: Superficially Cooperative


Eye Contact: Fair





- Speech


Quality: Unpressured


Latencies: Normal


Quantity: Appropriate





- Mood


Patient's Decription of Mood: "Good"





- Affect


Observed Affect: Euphoric


Affect Consistent with: Euphoria





- Thought Process


Patient's Thought Process: Coherent, Goal Directed





- Sensorium


Experiencing Hallucinations: No, Sensorium is Clear


Type of Hallucinations: Visual: No, Auditory: No, Command: No





- Level of Consciousness


Level of Consciousness: Alert


Orientation: Yes Intact, Yes Orientated to Time, Yes Orientated to Place, Yes 

Orientated to Person





- Impulse Control


Impulse Control: Intact





- Insight and Judgement


Insight and Judgement: Poor - but improving





- Group Participation


Particating in Group Activities: Yes





- Medication Management


Medication Management Adherence: Yes





Assessment





- Assessment


Merits Inpatient Hospitalization: For Immediate Safety, For Stabilization, For 

Discharge Planning


Inpatient DSM-V Dx: F25.0


Clinical Impression: 





This 63-year-old mentally disabled  male known to this facility from 

multiple prior psychiatric hospitalizations, who also suffers from aortic 

stenosis and atrial fibrillation, was brought to the emergency room this time 

once again for appointment and treatment nonadherence even for his ACT team.  

He is extremely tense, irritable, and guarded.





MHU: Problem List





- Patient Problems


(1) Schizoaffective disorder


Current Visit: Yes   Status: Acute   Code(s): F25.9 - SCHIZOAFFECTIVE DISORDER, 

UNSPECIFIED   SNOMED Code(s): 99121775


   





Plan





- Plan


Treatment Plan: 


Name: JOYTI STEEN                        


YOB: 1955                        


[id number]


[id number]








- Patient in the process of being transferred to [facility], patient has been 

gradually improving in his symptoms.


- Patient's medications were continued and has been compliant. Patient has been 

showing improvement and Depakene was reduced to 750 mg at Bedtime to help with 

recurrent sexual thoughts, mood instability and sleep disturbance. Patient  

Clonazepam at 0.25 mg BID to help with anxiety, pacing and racing thoughts as 

per patient's request. 


-Physical therapy recommended patient, flex and abd stretches at the wall, and 

posterior self stretch. 


- Abilify at 15 mg PO QAM with plan to titrate as needed and switch it to 

injectable Abilify as tolerated given EKG and QTc are with in safe range.


- Patient will be monitored for improvement and side effects. Risk and benefits 

were discussed.


-Patient  accepted at [facility], in transition to be transferred on Monday 11/12/18.


- Patient was encouraged to continue his participation in the milieu, group and 

individual therapy.


Medications: 


 Current Medications





Acetaminophen (Tylenol Tab*)  650 mg PO Q4H PRN


   PRN Reason: PAIN or TEMP > 101 F


   Last Admin: 11/09/18 05:35 Dose:  650 mg


Al Hydrox/Mg Hydrox/Simethicone (Maalox Plus*)  30 ml PO Q4H PRN


   PRN Reason: INDIGESTION


   Last Admin: 11/03/18 00:00 Dose:  30 ml


Aripiprazole (Abilify Tab*)  15 mg PO DAILY [facility]


   Last Admin: 11/09/18 07:43 Dose:  15 mg


Clonazepam (Klonopin Tab(*))  0.25 mg PO BID [facility]


   Last Admin: 11/09/18 07:44 Dose:  0.25 mg


Metoprolol Succinate (Toprol Xl Tab*)  25 mg PO DAILY [facility]


   Last Admin: 11/09/18 07:44 Dose:  25 mg


Valproic Acid (Depakene Liq(*))  750 mg PO BEDTIME [facility]


   Last Admin: 11/08/18 21:21 Dose:  750 mg

## 2018-11-10 RX ADMIN — VALPROIC ACID SCH MG: 250 SOLUTION ORAL at 20:12

## 2018-11-10 RX ADMIN — METOPROLOL SUCCINATE SCH MG: 25 TABLET, EXTENDED RELEASE ORAL at 07:53

## 2018-11-10 RX ADMIN — CLONAZEPAM SCH MG: 0.5 TABLET ORAL at 07:52

## 2018-11-10 RX ADMIN — ARIPIPRAZOLE SCH MG: 15 TABLET ORAL at 07:53

## 2018-11-10 RX ADMIN — CLONAZEPAM SCH MG: 0.5 TABLET ORAL at 20:51

## 2018-11-11 VITALS — DIASTOLIC BLOOD PRESSURE: 75 MMHG | SYSTOLIC BLOOD PRESSURE: 138 MMHG

## 2018-11-11 RX ADMIN — ARIPIPRAZOLE SCH MG: 15 TABLET ORAL at 08:58

## 2018-11-11 RX ADMIN — CLONAZEPAM SCH MG: 0.5 TABLET ORAL at 08:58

## 2018-11-11 RX ADMIN — CLONAZEPAM SCH MG: 0.5 TABLET ORAL at 21:10

## 2018-11-11 RX ADMIN — VALPROIC ACID SCH MG: 250 SOLUTION ORAL at 21:06

## 2018-11-11 RX ADMIN — METOPROLOL SUCCINATE SCH MG: 25 TABLET, EXTENDED RELEASE ORAL at 08:55

## 2018-11-12 RX ADMIN — CLONAZEPAM SCH MG: 0.5 TABLET ORAL at 08:06

## 2018-11-12 RX ADMIN — ACETAMINOPHEN PRN MG: 325 TABLET ORAL at 03:40

## 2018-11-12 RX ADMIN — METOPROLOL SUCCINATE SCH MG: 25 TABLET, EXTENDED RELEASE ORAL at 08:06

## 2018-11-12 RX ADMIN — ARIPIPRAZOLE SCH MG: 15 TABLET ORAL at 08:06

## 2019-07-07 NOTE — DS
Problem: Patient Care Overview  Goal: Plan of Care Review  Outcome: Ongoing (interventions implemented as appropriate)   07/07/19 0513   Coping/Psychosocial   Plan of Care Reviewed With patient   Plan of Care Review   Progress no change       Problem: Fall Risk (Adult)  Goal: Absence of Fall  Outcome: Ongoing (interventions implemented as appropriate)   07/07/19 0513   Fall Risk (Adult)   Absence of Fall achieves outcome       Problem: Cardiac: Heart Failure (Adult)  Goal: Signs and Symptoms of Listed Potential Problems Will be Absent, Minimized or Managed (Cardiac: Heart Failure)  Outcome: Ongoing (interventions implemented as appropriate)   07/07/19 0513   Goal/Outcome Evaluation   Problems Assessed (Heart Failure) all   Problems Present (Heart Failure) fluid/electrolyte imbalance       Problem: Skin Injury Risk (Adult)  Goal: Skin Health and Integrity  Outcome: Ongoing (interventions implemented as appropriate)   07/07/19 0513   Skin Injury Risk (Adult)   Skin Health and Integrity making progress toward outcome       Problem: Fluid Volume Excess (Adult)  Goal: Optimal Fluid Balance  Outcome: Ongoing (interventions implemented as appropriate)   07/07/19 0513   Fluid Volume Excess (Adult)   Optimal Fluid Balance making progress toward outcome          Subjective





- Subjective


Service Types: 22595 [facility] Day Mgmt complex over 30 min


Discharge Date: 11/12/18


Subjective: 





 





IDENTIFYING DATA:  Mr. Cueva is a 63-year-old mentally disabled  male

, well known to this unit and surrounding hospitals because of numerous 

psychiatric hospitalizations in the past.  His last hospitalization on this 

unit was on 09/09/17.  He was brought to the emergency department yesterday 

with the recommendation by his ACT team and he came here in an ambulance.


 


CHIEF COMPLAINT:  The patient has been intimidating and sexually inappropriate 

in the community and was nonadhering to his appointments and/or proposed 

treatments.





HISTORY OF PRESENT ILLNESS:  Rasheed was brought to the emergency room against 

his will because of the above complaints in the community, as he was a risk for 

violence in the community.  Rasheed has not been complying with his appointments 

with ACT team as well as refusing to take his medications as per our AOT orders 

resulting in acute decompensation psychiatrically.  During today's evaluation, 

Rasheed was fine for approximately 30 seconds, answered 


questions very superficially and then walked away from the evaluation saying he 

did not need any help.  He also declined for us to get his vital signs or do a 

physical examination.  Repeated requests by the nursing for him to come back 

and let us help him was unsuccessful, he just walked away.  Hence, the rest of 

history and physical will be from collateral information that we have on record.





PAST PSYCHIATRIC HISTORY:  Remarkable for numerous psychiatric hospitalizations 

starting at age 16.  He was diagnosed with schizophrenia/schizoaffective 

disorder, bipolar type.  In brief, he has always been noncompliant with any 

treatment recommendations despite the fact that he is on ACT team.





PAST MEDICAL HISTORY:  Remarkable for severe aortic stenosis and atrial 

fibrillation.  Rasheed had always declined any interventions offered to him in 

the past and today.





ALLERGIES:  Unknown.





FAMILY HISTORY:  From collaterals, it is evident that both sides of his family 

struggled with mental illness, especially due to the diagnosis of schizophrenia

, depression and anger management difficulties.  His older brother is also 

known to suffer from mental illness; however, there is no family history of 

suicide.


 


PERSONAL AND SOCIAL HISTORY:  Rasheed has been mentally disabled since teenage 

years, never .  No children.  He is currently followed by [place] 

ACT team. He is unemployed and is totally dependent on public assistance.


 


PHYSICAL EXAMINATION





Physical exam was offered.  Rasheed even did not allow us to do his vital signs, 

let alone do anything else.  Review of emergency department's physical exam did 

not indicate any problem whatsoever including his known diagnosis of severe 

aortic stenosis as well as atrial fibrillation.  There were no vitals available 

on record at that time; however, he does not appear to be in any kind of 

physical distress and we will continue to pursue to accomplish everything to 


make sure he is physically safe.





DIAGNOSTIC STUDIES/LAB DATA:  As such, an EKG was ordered along with an 

echocardiogram.  We may even request for a hospitalist consult in case EKG or 

echocardiogram shows any abnormal reports. Labs done in the emergency room were 

unremarkable.  His WBC count was 5.2, hemoglobin 13.6, hematocrit 38 which is 

slightly lower than normal, platelet count is 145 which is also on the lower 

end of normal.  Comprehensive metabolic profile shows a sodium level of 139, 

potassium 3.6, chloride 105, carbon dioxide 28, BUN 16, creatinine 0.5.  Rest 

of the report was also unremarkable.





MENTAL STATUS EXAMINATION On ADMISSION:  This 63-year-old male who is 

appropriately dressed, poorly groomed, alert and oriented to place and person.  

Due to his uncooperativeness, mental status is only observed.  The patient 

declined to cooperate with answering any questions.  He appears to be 

internally preoccupied, very tense, irritable, and severely dysphoric.  Unable 

to sit still, pacing fast in the hallways, declines to answer any questions.  

He definitely is a high risk for violence if not observed very closely.





DIAGNOSTIC IMPRESSION On ADMISSION:





MENTAL HEALTH DIAGNOSIS:  Schizoaffective disorder, bipolar type.





PHYSICAL HEALTH DIAGNOSES:


1.  Aortic stenosis.


2.  History of atrial fibrillation.





DIAGNOSTIC IMPRESSION On DISCHARGE:





MENTAL HEALTH DIAGNOSIS:  Schizoaffective disorder, bipolar type.





PHYSICAL HEALTH DIAGNOSES:


1.  History of atrial fibrillation.


2. Trace of Aortic Regurgitation 





Objective





- Appearance


Appearance: Healthy Appearing


Dysmorphic Features: No


Hygiene: Normal





- Behavior


Psychomotor Activities: Abnormal-Increased


Exhibits Abnormal Movement: No





- Attitude and Relatedness


Attitude and Relatedness: Irritable


Eye Contact: Fair





- Speech


Quality: Unpressured


Latencies: Normal


Quantity: Appropriate





- Mood


Patient's Decription of Mood: "Anxious"





- Affect


Observed Affect: Constricted


Affect Consistent with: Dysphoria





- Thought Process


Patient's Thought Process: Goal Directed, Circumstantial


Thought Content: Yes Paranoid Ideation, No Passive Death Wish, No Suicidal 

Planning, No Homicidal Ideation





- Sensorium


Experiencing Hallucinations: No, Sensorium is Clear


Type of Hallucinations: Visual: No, Auditory: No, Command: No





- Level of Consciousness


Level of Consciousness: Agitated


Orientation: Yes Intact, Yes Orientated to Time, Yes Orientated to Place, Yes 

Orientated to Person





- Impulse Control


Impulse Control: Poor - but improving





- Insight and Judgement


Insight and Judgement: Poor - but improving





- Medication Management


Medication Management Adherence: Yes





Treatment Course & Assessment


Clinical Course & Impression: 





This 63-year-old mentally disabled  male known to this facility from 

multiple prior psychiatric hospitalizations, who also suffers from aortic 

stenosis and atrial fibrillation, was brought to the emergency room this time 

once again for appointment and treatment nonadherence even for his ACT team.  

He is extremely tense, irritable, and guarded. Rasheed was hospitalized on the 

behavioral science unit for his and others safety in the community.  His code 

status was full.  Supportive milieu, individual, and group therapy was offered; 

hopefully, which he was not interested until later during his hospitalization.  

He was monitored very closely for possible violence on the unit.  We requested 

an EKG and echocardiogram. His Qtc was > 500 on EKG and Echocardiogram results 

displayed mild to moderate concentric left ventricular hypertrophy, ejection 

fraction was 50-55% with trace of aortic regurgitation.





Patient was seen by self, discussed with treatment team, chart was reviewed. 

Patient was non compliant with his medications during initial days of 

hospitalization.Patient was in euphoric to irritable mood with hypersexual and 

paranoid thoughts. Patient was also reporting increase in religiosity. Patient 

was unpredictable with his behavior on the unit and was inappropriate with his 

boundaries towards other patients. Patient reportedly has been resistant to 

treatment and refusing PO medications including both for mental and physical 

health. Patient sleeping was impaired and was found pacing in the hallways.  

Patient was un cooperative with staff. Patient's pulse rate was increased in 150

's but asymptomatic. Patient's medications were continued but was non 

compliant. Multiple attempts were made to educate patient about compliance with 

his treatment. But continued to resist. 





Patient continued to rationalizes his not needing any medications even after 

court order. Patient had no insight in his medical or psychiatric illness.  

Patient was inappropriate with his boundaries towards other especially female 

patients. Patient was started on constant observation. Patient reportedly was 

struggling with his sexual urges and lustful thoughts. Patient continues to 

have paranoid delusions about water fountain that he feels might be poisoned. 

Patient continues to have paranoid and trust issues toward medical provider and 

still refuses his medications. Pursued with medication over objection which was 

approved by the court.





Patient's medications were adjusted due concerns of increase Qtc, Invega 

Sustena was not started. Instead Abilify was considered after obtaining medical 

consult. With also discontinuation of Lithium and started Depakene. Patient was 

partially compliant. Patient was encouraged to comply with his medications. 

Patient agreed to take Depakene to help with recurrent sexual thoughts, mood 

instability and sleep disturbance. Patient was given IM medications as per 

court order which resulted in compliance moving forward. Hospitalist consult 

was called to assess need for Xarelto and if any urgency in controlling pulse 

rate. Hospitalist recommended discontinuation of Xarelto given patient poor 

compliance. As it increases the risk of stroke with irregular nature of 

compliance. To control rate of Atrial Fibrillation, patient responded well to 

Meotprolol XL 25 mg PO Daily. 





Patient was showing improvement and less delusional in his thinking, making 

less paranoid and persecutory comments. Patient reported that he has better 

self control with medications. Patient although continued to have fluctuating 

insight in to his illness and struggled with ambivalence about his treatment. 

Patient required slow titration of his medications and EKG monitoring due to 

his prolong Qtc, ad Abilify was increased gradually wiht Depakene. After 

another court hearing patient was cleared for retention and referral to [facility] 

facility as he required longer term hospitalization for stabilization. Patient 

EKG monitoring displayed <450 Qtc with 15 mg of Abilify. Patient Depakene was 

increased to 1000mg at Bedtime with level of 85. But patient later requested 

down titration and was reduced to 750 mg at Bedtime.





Patient was gradually improving but continued to have lability in his mood and 

ongoing psychotic symptoms. Patient was accepted at [facility]. 

Patient was distress about his transfer and required PO Ativan to help smooth 

transfer. Patient agreed with the plan and was willing to work with treatment 

providers at [facility] for further treatment and discharge 

planning with AOT and ACT team. Patient was safe on all check during later days 

of hospitalization and denied any suicidal or homicidal ideation. Patient had 

reduction in level of observation to Q30 minutes. Patient was able to obtain 

privileges like comfort room, computer privileges and staff pass, which he use 

appropriately. 


Merits Inpatient Hospitalization: Yes


Clear for Discharge: Other - [facility]


Inpatient DSM-V Dx: F25.0





Discharge Planning





- Discharge Planning


Discharge Plan: Consider Longer Term Tx


Outpatient Program: [facility]


Medications: 





Discharge Medications:





Acetaminophen (Tylenol Tab*)  650 mg PO Q4H PRN


   PRN Reason: PAIN or TEMP > 101 F


   Last Admin: 11/09/18 05:35 Dose:  650 mg


Al Hydrox/Mg Hydrox/Simethicone (Maalox Plus*)  30 ml PO Q4H PRN


   PRN Reason: INDIGESTION


   Last Admin: 11/03/18 00:00 Dose:  30 ml


Aripiprazole (Abilify Tab*)  15 mg PO DAILY [facility]


   Last Admin: 11/09/18 07:43 Dose:  15 mg


Clonazepam (Klonopin Tab(*))  0.25 mg PO BID [facility]


   Last Admin: 11/09/18 07:44 Dose:  0.25 mg


Metoprolol Succinate (Toprol Xl Tab*)  25 mg PO DAILY [facility]


   Last Admin: 11/09/18 07:44 Dose:  25 mg


Valproic Acid (Depakene Liq(*))  750 mg PO BEDTIME [facility]


   Last Admin: 11/08/18 21:21 Dose:  750 mg


Discharge Planning: 


Prescriptions provided for discharge                  [] Yes   [x] No   





Follow up care details as per social work arrangements.


Patient response to discharge plan:   


                                                                 [] eager for 

discharge


                                    [] agreeable with discharge plan


                                  [] ambivalent about discharge


                                   [] disagrees with discharge today

## 2019-07-17 ENCOUNTER — HOSPITAL ENCOUNTER (EMERGENCY)
Dept: HOSPITAL 25 - UCEAST | Age: 64
Discharge: HOME | End: 2019-07-17
Payer: MEDICARE

## 2019-07-17 VITALS — DIASTOLIC BLOOD PRESSURE: 99 MMHG | SYSTOLIC BLOOD PRESSURE: 139 MMHG

## 2019-07-17 DIAGNOSIS — L97.529: ICD-10-CM

## 2019-07-17 DIAGNOSIS — I48.91: ICD-10-CM

## 2019-07-17 DIAGNOSIS — Z88.0: ICD-10-CM

## 2019-07-17 DIAGNOSIS — L03.116: Primary | ICD-10-CM

## 2019-07-17 DIAGNOSIS — L03.115: ICD-10-CM

## 2019-07-17 DIAGNOSIS — L97.519: ICD-10-CM

## 2019-07-17 NOTE — UC
Lower Extremity/Ankle HPI





- HPI Summary


HPI Summary: 


PATIENT PRESENTS ACCOMPANIED BY HIS SISTER COMPLAINING OF 3 DAYS OF WORSENING 

BILATERAL FOOT PAIN, REDNESS AND SWELLING.  PATIENT IS UNCOOPERATIVE AND 

REFUSES TO ANSWER ANY QUESTIONS DURING THE ENCOUNTER.  HIS SISTER REPORTS HE 

HAS BEEN WEARING SHOES THAT ARE TOO SMALL WITH NO SOCKS.  SHE STATES AS FAR AS 

SHE KNOWS HE IS PREDIABETIC.  SHE REPORTS PATIENT DOES HAVE MENTAL ILLNESS BUT 

HAS BEEN OFF HIS MEDICATIONS FOR MANY YEARS.  HE IS NOT AGGRESSIVE HOWEVER IS 

REFUSING ANY KIND OF EXAM.  REFUSES TO ANSWER QUESTIONS.  HE REFUSES TO REMOVE 

HIS SANDALS.





- History of Current Complaint


Chief Complaint: UCLowerExtremity


Stated Complaint: R FOOT PAIN


Time Seen by Provider: 07/17/19 10:33


Hx Obtained From: Family/Caretaker - SISTER


Onset/Duration: Gradual Onset, Lasting Days, Still Present


Severity Initially: Moderate


Severity Currently: Moderate


Pain Intensity: 0


Pain Scale Used: 0-10 Numeric


Aggravating Factor(s): Nothing


Alleviating Factor(s): Nothing


Able to Bear Weight: Yes





- Allergies/Home Medications


Allergies/Adverse Reactions: 


 Allergies











Allergy/AdvReac Type Severity Reaction Status Date / Time


 


house dust mite Allergy Unknown Unknown Verified 07/17/19 10:02





   Reaction  





   Details  


 


haloperidol [From Haldol] Allergy  Unknown Verified 07/17/19 10:02





   Reaction  





   Details  


 


mold Allergy  Unknown Verified 07/17/19 10:02





   Reaction  





   Details  


 


Penicillins Allergy  Unknown Verified 07/17/19 10:02





   Reaction  





   Details  


 


pork derived (porcine) Allergy  Unknown Verified 07/17/19 10:02





   Reaction  





   Details  


 


trifluoperazine Allergy  Unknown Verified 07/17/19 10:02





   Reaction  





   Details  


 


dust Allergy  Unknown Uncoded 07/17/19 10:02





   Reaction  





   Details  














PMH/Surg Hx/FS Hx/Imm Hx


Endocrine History: Diabetes - "PREDIABETIC"


Cardiovascular History: Atrial Fibrillation


Psychological History: Other - PT WITH UNSPECIFIED MENTAL ILLNESS


Other History Of: 


   Negative For: Anticoagulant Therapy





- Surgical History


Surgical History: None





- Family History


Known Family History: Positive: Unknown - Patient is a LEVEL 5 CAVEAT, Other


Family History: schizophrenia, depression





- Social History


Alcohol Use: None


Alcohol Amount: patient denies etoh use


Substance Use Type: None


Smoking Status (MU): Never Smoked Tobacco





- Immunization History


Most Recent Influenza Vaccination: Unable to recall


Most Recent Tetanus Shot: Unable to recall


Most Recent Pneumonia Vaccination: Unable to recall





Review of Systems


All Other Systems Reviewed And Are Negative: Yes


Constitutional: Positive: Negative


Skin: Positive: Other - ULCERS, ERYTHEMA BILATERAL FEET


Respiratory: Positive: Negative


Cardiovascular: Positive: Negative


Gastrointestinal: Positive: Negative





Physical Exam


Triage Information Reviewed: Yes


Appearance: Well-Appearing, No Pain Distress, Well-Nourished


Vital Signs: 


 Initial Vital Signs











Temp  0 F   07/17/19 09:25


 


Pulse  94   07/17/19 09:25


 


Resp  16   07/17/19 09:25


 


BP  139/99   07/17/19 09:25


 


Pulse Ox  100   07/17/19 09:25











Vital Signs Reviewed: Yes


Eyes: Positive: Conjunctiva Clear


ENT: Positive: Hearing grossly normal


Neck: Positive: Supple


Respiratory: Positive: No respiratory distress, No accessory muscle use


Musculoskeletal: Positive: Edema @ - BILATERAL FEET WITH ULCERATIONS AND EDEMA, 

Other: - PT DECLINED TO ALLOW A COMPLETE EXAM. ON VISUAL EXAMINATION HE APPEARS 

TO HAVE NUMEROUS OPEN ULCERATIONS ON HIS FEET AND TOES WITH SURROUNDING 

ERYTHEMA AND EDEMA.


Neurological: Positive: Alert


Psychological: Positive: Other: - PT IS CALM BUT SEEMS DEFIANT AND IS REFUSING 

CARE


Skin: Positive: Other - ULCERATIONS AND ERYTHEMA BOTH FEET





Lower Extremity Course/Dx





- Course


Course Of Treatment: 





PATIENT ARRIVES ACCOMPANIED BY HIS SISTER.  HE HAS A HISTORY OF MENTAL ILLNESS 

BUT HAS BEEN OFF HIS MEDICATIONS FOR YEARS.  SISTER IS UNCLEAR AS TO WHAT 

PROVIDERS HE IS SUPPOSED TO BE SEEING AT THIS POINT.  HE REPORTS A THREE-DAY 

HISTORY OF WORSENING ULCERATIONS ON HIS BILATERAL FEET.  HIS SISTER STATES HE 

HAS BEEN WEARING SHOES THAT ARE TOO SMALL WITH NO SOCKS.  SHE REPORTS HE IS 

PREDIABETIC.  THROUGHOUT THE ENCOUNTER PATIENT IS CALM BUT UNCOOPERATIVE.  IS 

REFUSING A PHYSICAL EXAM AND STATES HE WANTS TO BE DISCHARGED.  BASED ON 

SUPERFICIAL VISUAL EXAMINATION OF HIS FEET PATIENT APPEARS TO HAVE SEVERAL 

DIABETIC FOOT ULCERATIONS.  WILL COVER FOR STREP, MRSA, ANAEROBES AND 

PSEUDOMONAS WITH CIPRO AND CLINDAMYCIN.  WILL USE TWICE A DAY DOSING FOR EASE 

OF USE.  WOUND CARE CLINIC CONTACT INFORMATION PROVIDED.  I ENCOURAGED HIS 

SISTER TO ASSIST HIM WITH ESTABLISHING WITH REGULAR FOLLOW-UP CARE.





- Differential Dx/Diagnosis


Provider Diagnosis: 


 Foot ulcer, Cellulitis of both feet








Discharge





- Sign-Out/Discharge


Documenting (check all that apply): Patient Departure


All imaging exams completed and their final reports reviewed: No Studies





- Discharge Plan


Condition: Stable


Disposition: HOME


Prescriptions: 


Ciprofloxacin TAB* [Cipro 500 MG TAB*] 500 mg PO BID #20 tab


Clindamycin HCl 600 mg PO BID #40 capsule


Patient Education Materials:  Cellulitis (ED), Diabetic Foot Ulcers (ED)


Referrals: 


[facility] [Outside] - 1 Week


Rachid Luevano MD [Primary Care Provider] - 1 Week


Additional Instructions: 


THE WOUNDS ON YOUR FEET APPEAR TO BE INFECTED HOWEVER I WAS UNABLE TO PERFORM A 

THOROUGH EXAMINATION.  TAKE ANTIBIOTICS FOR THE FULL 10 DAYS.  I'VE GIVEN YOU 

MEDICATIONS THAT ARE APPROPRIATE FOR EMPIRIC COVERAGE OF DIABETIC FOOT ULCERS 

GIVEN YOUR HISTORY OF POSSIBLE DIABETES.  I HIGHLY RECOMMEND YOU FOLLOW-UP WITH 

THE WOUND CARE CLINIC FOR FURTHER EVALUATION AND TREATMENT.  GO TO THE 

EMERGENCY ROOM WITHOUT FAIL IF YOU DEVELOP FEVER, WORSENING PAIN OR ANY OTHER 

CONCERNING SYMPTOMS.





BE SURE TO WEAR APPROPRIATELY SIZED SHOES WITH GOOD PADDING TO PROTECT YOUR 

FEET.





[facility]


[phone]











CALL THE NUMBER BELOW FOR ASSISTANCE IN ESTABLISHING WITH A PCP


An additional resource available to assist in finding the appropriate physician 

for your health care needs is [facility] ([facility]).  

You may contact them by calling [phone].





- Billing Disposition and Condition


Condition: STABLE


Disposition: Home